# Patient Record
Sex: FEMALE | Race: WHITE | Employment: FULL TIME | URBAN - METROPOLITAN AREA
[De-identification: names, ages, dates, MRNs, and addresses within clinical notes are randomized per-mention and may not be internally consistent; named-entity substitution may affect disease eponyms.]

---

## 2016-09-28 LAB
HBV SURFACE AG SERPL QL IA: REACTIVE
HIV 1+2 AB+HIV1 P24 AG SERPL QL IA: NON REACTIVE
RUBELLA ABY IGG: NORMAL
T PALLIDUM IGG SER QL: NON REACTIVE

## 2017-01-09 ENCOUNTER — ONCOLOGY VISIT (OUTPATIENT)
Dept: ONCOLOGY | Facility: CLINIC | Age: 32
End: 2017-01-09
Attending: OBSTETRICS & GYNECOLOGY
Payer: COMMERCIAL

## 2017-01-09 VITALS
SYSTOLIC BLOOD PRESSURE: 124 MMHG | RESPIRATION RATE: 16 BRPM | WEIGHT: 180.1 LBS | HEIGHT: 66 IN | OXYGEN SATURATION: 97 % | HEART RATE: 90 BPM | TEMPERATURE: 97.9 F | BODY MASS INDEX: 28.94 KG/M2 | DIASTOLIC BLOOD PRESSURE: 73 MMHG

## 2017-01-09 DIAGNOSIS — D27.1 MUCINOUS CYSTADENOMA OF LEFT OVARY: Primary | ICD-10-CM

## 2017-01-09 PROCEDURE — 99212 OFFICE O/P EST SF 10 MIN: CPT | Mod: ZF

## 2017-01-09 ASSESSMENT — PAIN SCALES - GENERAL: PAINLEVEL: NO PAIN (0)

## 2017-01-09 NOTE — Clinical Note
2017       RE: Haylie Bales  [id number]  [facility] [address]     Dear Colleague,    Thank you for referring your patient, Haylie Bales, to the [facility]. Please see a copy of my visit note below.    Gynecologic Oncology Clinic - Follow-up Visit    Date: 2017    Dr. Jose Mendosa MD  [address]  [place] [address]     RE: Haylie Bales  : 1985  VARUN: 2017    HPI:    Haylie Bales is a 31 year old woman  at 23w2d s/p diagnostic laparoscopy with left salpingo-oophorectomy on 12/15/16. 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 Haylie states she is feeling well. She only used pain medication for the first 36 hours following surgery. She denies any nausea, vomiting, fever, chills, chest pain, shortness of breath, or dysuria. Reviewed further surgical restrictions including 6 weeks of pelvic rest and no heavy lifting. She follows with OBMARSHALL Bajwa with Dr. Macias. Patient feels good fetal movement      Review of Systems:  See HPI    Past Medical History:  Past Medical History   Diagnosis Date     No active medical problems      Pregnancy      Achilles tendonitis        Past Surgical History:  Past Surgical History   Procedure Laterality Date     Hc tooth extraction w/forcep       Lasik bilateral       Laparoscopic salpingo-oophorectomy Left 12/15/2016     Procedure: LAPAROSCOPIC SALPINGO-OOPHORECTOMY;  Surgeon: Jose Mendosa MD;  Location:  OR       Health Maintenance:  Health Maintenance Due   Topic Date Due     INFLUENZA VACCINE (SYSTEM ASSIGNED)  2016     MATERNAL SCREENING  2016     OBGCT (OB)  2017     PAP SCREENING Q3 YR (SYSTEM ASSIGNED)  2017       Current Medications:   Current Outpatient Prescriptions   Medication Sig Dispense Refill     Fjuwdv-Bdlofzivi-Jqoo-Fish Oil (PRENATAL + COMPLETE  "MULTI PO) Take 1 tablet by mouth daily       UNABLE TO FIND Take 1 capsule by mouth daily MEDICATION NAME: [place] [employer] DHEA 830 mg - Omega 3 400 IU         Allergies:   Allergies   Allergen Reactions     Sulfa Drugs Rash        Social History:     Social History   Substance Use Topics     Smoking status: Never Smoker      Smokeless tobacco: Never Used     Alcohol Use: No      Comment: For Marital Support stopped 2014       History   Drug Use No       Family History:   Family History   Problem Relation Age of Onset     Alcohol/Drug Mother      Etoh     Hypertension Mother      DIABETES Mother      Psychotic Disorder Father      Schizophrenia, Depression     Hypertension Father      CEREBROVASCULAR DISEASE Maternal Grandmother      CEREBROVASCULAR DISEASE Maternal Grandfather      Cardiovascular Paternal Grandmother      aortic dissection in her 80s       Physical Exam:   /73 mmHg  Pulse 90  Temp(Src) 97.9  F (36.6  C) (Oral)  Resp 16  Ht 1.676 m (5' 5.98\")  Wt 81.693 kg (180 lb 1.6 oz)  BMI 29.08 kg/m2  SpO2 97%  LMP 2016  Body mass index is 29.08 kg/(m^2).  General :  healthy and alert, no distress  Gastrointestinal:       abdomen soft, non-tender, non-distended, gravid, 4 laparoscopic sites visualized with good areas of healing, crusted skin present on incision site above umbilicus and on the right upper abdomen      Assessment:  Haylie Bales is a 31 year old woman  at 23w2d s/p diagnostic laparoscopy with left salpingo-oophorectomy on 12/15/16. 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 Patient is healing well from her surgery. Reviewed lifting precautions and pelvic rest for a total of 6 weeks from the surgery. Patient had no further questions or concerns. She will follow up with Dr. Macias as scheduled for routine " OBGYN care.    Acting scribe for Dr. Navya Jiménez MD  [facility], PGY-3  1/9/2017 7:05 AM     Jose Mendosa MD, MS    Department of Obstetrics and Gynecology   Division of Gynecologic Oncology   [facility]  Phone: [phone]    CC  Dr. Macias at [facility]

## 2017-01-09 NOTE — NURSING NOTE
"Haylie Bales is a 31 year old female who presents for:  Chief Complaint   Patient presents with     Oncology Clinic Visit     Post Op        Initial Vitals:  /73 mmHg  Pulse 90  Temp(Src) 97.9  F (36.6  C) (Oral)  Resp 16  Ht 1.676 m (5' 5.98\")  Wt 81.693 kg (180 lb 1.6 oz)  BMI 29.08 kg/m2  SpO2 97%  LMP 07/30/2016 Estimated body mass index is 29.08 kg/(m^2) as calculated from the following:    Height as of this encounter: 1.676 m (5' 5.98\").    Weight as of this encounter: 81.693 kg (180 lb 1.6 oz).. Body surface area is 1.95 meters squared. BP completed using cuff size: regular  No Pain (0) Patient's last menstrual period was 07/30/2016. Allergies and medications reviewed.     Medications: Medication refills not needed today.  Pharmacy name entered into [employer]: [id number] - [place], [place] - [address]    Comments:     7 minutes for nursing intake (face to face time)   Skylar Saeed LPN          "

## 2017-04-07 ENCOUNTER — APPOINTMENT (OUTPATIENT)
Dept: ULTRASOUND IMAGING | Facility: CLINIC | Age: 32
End: 2017-04-07
Attending: OBSTETRICS & GYNECOLOGY
Payer: COMMERCIAL

## 2017-04-07 ENCOUNTER — HOSPITAL ENCOUNTER (OUTPATIENT)
Facility: CLINIC | Age: 32
Discharge: HOME | End: 2017-04-07
Attending: OBSTETRICS & GYNECOLOGY | Admitting: OBSTETRICS & GYNECOLOGY
Payer: COMMERCIAL

## 2017-04-07 VITALS
HEIGHT: 66 IN | SYSTOLIC BLOOD PRESSURE: 139 MMHG | TEMPERATURE: 98.7 F | DIASTOLIC BLOOD PRESSURE: 83 MMHG | WEIGHT: 205 LBS | BODY MASS INDEX: 32.95 KG/M2

## 2017-04-07 LAB
ALT SERPL W P-5'-P-CCNC: 39 U/L (ref 0–50)
ANION GAP SERPL CALCULATED.3IONS-SCNC: 10 MMOL/L (ref 3–14)
AST SERPL W P-5'-P-CCNC: 21 U/L (ref 0–45)
BUN SERPL-MCNC: 12 MG/DL (ref 7–30)
CALCIUM SERPL-MCNC: 8.8 MG/DL (ref 8.5–10.1)
CHLORIDE SERPL-SCNC: 102 MMOL/L (ref 94–109)
CO2 SERPL-SCNC: 23 MMOL/L (ref 20–32)
CREAT SERPL-MCNC: 0.4 MG/DL (ref 0.52–1.04)
CREAT UR-MCNC: 21 MG/DL
ERYTHROCYTE [DISTWIDTH] IN BLOOD BY AUTOMATED COUNT: 13.5 % (ref 10–15)
GFR SERPL CREATININE-BSD FRML MDRD: ABNORMAL ML/MIN/1.7M2
GLUCOSE SERPL-MCNC: 85 MG/DL (ref 70–99)
HCT VFR BLD AUTO: 35.6 % (ref 35–47)
HGB BLD-MCNC: 11.8 G/DL (ref 11.7–15.7)
MCH RBC QN AUTO: 30.3 PG (ref 26.5–33)
MCHC RBC AUTO-ENTMCNC: 33.1 G/DL (ref 31.5–36.5)
MCV RBC AUTO: 92 FL (ref 78–100)
PLATELET # BLD AUTO: 206 10E9/L (ref 150–450)
POTASSIUM SERPL-SCNC: 4.3 MMOL/L (ref 3.4–5.3)
PROT UR-MCNC: 0.08 G/L
PROT/CREAT 24H UR: 0.37 G/G CR (ref 0–0.2)
RBC # BLD AUTO: 3.89 10E12/L (ref 3.8–5.2)
SODIUM SERPL-SCNC: 135 MMOL/L (ref 133–144)
URATE SERPL-MCNC: 3.6 MG/DL (ref 2.6–6)
WBC # BLD AUTO: 10 10E9/L (ref 4–11)

## 2017-04-07 PROCEDURE — 80048 BASIC METABOLIC PNL TOTAL CA: CPT | Performed by: OBSTETRICS & GYNECOLOGY

## 2017-04-07 PROCEDURE — 84450 TRANSFERASE (AST) (SGOT): CPT | Performed by: OBSTETRICS & GYNECOLOGY

## 2017-04-07 PROCEDURE — 59025 FETAL NON-STRESS TEST: CPT

## 2017-04-07 PROCEDURE — 96372 THER/PROPH/DIAG INJ SC/IM: CPT

## 2017-04-07 PROCEDURE — 85027 COMPLETE CBC AUTOMATED: CPT | Performed by: OBSTETRICS & GYNECOLOGY

## 2017-04-07 PROCEDURE — 76815 OB US LIMITED FETUS(S): CPT

## 2017-04-07 PROCEDURE — 25000125 ZZHC RX 250

## 2017-04-07 PROCEDURE — 36415 COLL VENOUS BLD VENIPUNCTURE: CPT | Performed by: OBSTETRICS & GYNECOLOGY

## 2017-04-07 PROCEDURE — 84550 ASSAY OF BLOOD/URIC ACID: CPT | Performed by: OBSTETRICS & GYNECOLOGY

## 2017-04-07 PROCEDURE — 99214 OFFICE O/P EST MOD 30 MIN: CPT | Mod: 25

## 2017-04-07 PROCEDURE — 84156 ASSAY OF PROTEIN URINE: CPT | Performed by: OBSTETRICS & GYNECOLOGY

## 2017-04-07 PROCEDURE — 82565 ASSAY OF CREATININE: CPT | Performed by: OBSTETRICS & GYNECOLOGY

## 2017-04-07 PROCEDURE — 76819 FETAL BIOPHYS PROFIL W/O NST: CPT

## 2017-04-07 PROCEDURE — 84460 ALANINE AMINO (ALT) (SGPT): CPT | Performed by: OBSTETRICS & GYNECOLOGY

## 2017-04-07 RX ORDER — BETAMETHASONE SODIUM PHOSPHATE AND BETAMETHASONE ACETATE 3; 3 MG/ML; MG/ML
12 INJECTION, SUSPENSION INTRA-ARTICULAR; INTRALESIONAL; INTRAMUSCULAR; SOFT TISSUE EVERY 24 HOURS
Status: DISCONTINUED | OUTPATIENT
Start: 2017-04-07 | End: 2017-04-07 | Stop reason: HOSPADM

## 2017-04-07 RX ORDER — ONDANSETRON 2 MG/ML
4 INJECTION INTRAMUSCULAR; INTRAVENOUS EVERY 6 HOURS PRN
Status: DISCONTINUED | OUTPATIENT
Start: 2017-04-07 | End: 2017-04-07 | Stop reason: HOSPADM

## 2017-04-07 RX ORDER — BETAMETHASONE SODIUM PHOSPHATE AND BETAMETHASONE ACETATE 3; 3 MG/ML; MG/ML
INJECTION, SUSPENSION INTRA-ARTICULAR; INTRALESIONAL; INTRAMUSCULAR; SOFT TISSUE
Status: COMPLETED
Start: 2017-04-07 | End: 2017-04-07

## 2017-04-07 RX ADMIN — BETAMETHASONE SODIUM PHOSPHATE AND BETAMETHASONE ACETATE 12 MG: 3; 3 INJECTION, SUSPENSION INTRA-ARTICULAR; INTRALESIONAL; INTRAMUSCULAR at 19:21

## 2017-04-07 RX ADMIN — BETAMETHASONE SODIUM PHOSPHATE AND BETAMETHASONE ACETATE 12 MG: 3; 3 INJECTION, SUSPENSION INTRA-ARTICULAR; INTRALESIONAL; INTRAMUSCULAR; SOFT TISSUE at 19:21

## 2017-04-07 NOTE — PLAN OF CARE
Primip comes to triage from clinic for elevated blood pressures.    EUM/US applied. Serial BPs initiated.  Urine collected and labs drawn.    Admission database obtained, and prenatal record reviewed.    Monitors removed, and Pt to Ultrasound for BPP and growth US.    P returned from US.    Results/BPs updated to Dr. Macias.    Give Beta.  Have Pt return to MAC in 24hours for repeat Beta, NST, and BP check.    Pt to call  Monday for appt to see Dr. Macias and BPP.    Discharge instructions given with verbal understanding, and Pt d/c to home with spouse.

## 2017-04-07 NOTE — IP AVS SNAPSHOT
MRN:[id number]                      After Visit Summary   4/7/2017    Haylie Bales    MRN: [id number]           Thank you!     Thank you for choosing [facility] for your care. Our goal is always to provide you with excellent care. Hearing back from our patients is one way we can continue to improve our services. Please take a few minutes to complete the written survey that you may receive in the mail after you visit with us. Thank you!        Patient Information     Date Of Birth          1985        About your hospital stay     You were admitted on:  April 7, 2017 You last received care in the:  [facility]    You were discharged on:  April 7, 2017       Who to Call     For medical emergencies, please call 911.  For non-urgent questions about your medical care, please call your primary care provider or clinic, [phone]          Attending Provider     Provider Specialty    Bettina Orozco MD OB/Gyn       Primary Care Provider Office Phone # Fax #    Bettina Macias [phone]       OB-GYN [address] [address]        Further instructions from your care team       Discharge Instruction for Undelivered Patients      You were seen for: Blood Pressure Assessment  We Consulted: Dr. Macias  You had (Test or Medicine):Fetal monitoring; US/BPP; PIH labs; Betamethasone     Diet:   Drink 8 to 12 glasses of liquids (milk, juice, water) every day.  You may eat meals and snacks.     Activity:  Call your doctor or nurse midwife if your baby is moving less than usual.     Call your provider if you notice:  Swelling in your face or increased swelling in your hands or legs.  Headaches that are not relieved by Tylenol (acetaminophen).  Changes in your vision (blurring: seeing spots or stars.)  Nausea (sick to your stomach) and vomiting (throwing up).   Weight gain of 5 pounds or more per week.  Heartburn that  "doesn't go away.  Signs of bladder infection: pain when you urinate (use the toilet), need to go more often and more urgently.  The bag of rubio (rupture of membranes) breaks, or you notice leaking in your underwear.  Bright red blood in your underwear.  Abdominal (lower belly) or stomach pain.  For first baby: Contractions (tightening) less than 5 minutes apart for one hour or more.  Second (plus) baby: Contractions (tightening) less than 10 minutes apart and getting stronger.  *If less than 34 weeks: Contractions (tightenings) more than 6 times in one hour.  Increase or change in vaginal discharge (note the color and amount)  Other:     Follow-up:    Return to Triage tomorrow at 1930 for repeat Betamethasone, NST, and BP check.    Call Mon am to schedule appt and BPP with Dr. Macias on Tues.          Pending Results     No orders found from 4/5/2017 to 4/8/2017.            Admission Information     Date & Time Provider Department Dept. Phone    4/7/2017 Bettina Orozco MD [facility] [phone]      Your Vitals Were     Blood Pressure Temperature Height Weight Last Period BMI (Body Mass Index)    139/83 98.7  F (37.1  C) (Axillary) 1.676 m (5' 6\") 93 kg (205 lb) 07/30/2016 33.09 kg/m2      [employer] Information     [employer] gives you secure access to your electronic health record. If you see a primary care provider, you can also send messages to your care team and make appointments. If you have questions, please call your primary care clinic.  If you do not have a primary care provider, please call [phone] and they will assist you.        Care EveryWhere ID     This is your Care EveryWhere ID. This could be used by other organizations to access your [place] medical records  [id number]           Review of your medicines      UNREVIEWED medicines. Ask your doctor about these medicines        Dose / Directions    ferrous sulfate Dried 160 (50 FE) MG tablet   Indication:  Iron " Deficiency        Dose:  1 tablet   Take 1 tablet by mouth 2 times daily   Refills:  0       PRENATAL + COMPLETE MULTI PO        Dose:  1 tablet   Take 1 tablet by mouth daily   Refills:  0                Protect others around you: Learn how to safely use, store and throw away your medicines at www.disposemymeds.org.             Medication List: This is a list of all your medications and when to take them. Check marks below indicate your daily home schedule. Keep this list as a reference.      Medications           Morning Afternoon Evening Bedtime As Needed    ferrous sulfate Dried 160 (50 FE) MG tablet   Take 1 tablet by mouth 2 times daily                                PRENATAL + COMPLETE MULTI PO   Take 1 tablet by mouth daily

## 2017-04-07 NOTE — IP AVS SNAPSHOT
[facility]    [address]    [place] [address]    Phone:  [phone]                                       After Visit Summary   4/7/2017    Haylie Bales    MRN: [id number]           After Visit Summary Signature Page     I have received my discharge instructions, and my questions have been answered. I have discussed any challenges I see with this plan with the nurse or doctor.    ..........................................................................................................................................  Patient/Patient Representative Signature      ..........................................................................................................................................  Patient Representative Print Name and Relationship to Patient    ..................................................               ................................................  Date                                            Time    ..........................................................................................................................................  Reviewed by Signature/Title    ...................................................              ..............................................  Date                                                            Time

## 2017-04-08 ENCOUNTER — HOSPITAL ENCOUNTER (OUTPATIENT)
Facility: CLINIC | Age: 32
Discharge: HOME | End: 2017-04-08
Attending: OBSTETRICS & GYNECOLOGY | Admitting: OBSTETRICS & GYNECOLOGY
Payer: COMMERCIAL

## 2017-04-08 VITALS — DIASTOLIC BLOOD PRESSURE: 74 MMHG | TEMPERATURE: 98.2 F | SYSTOLIC BLOOD PRESSURE: 127 MMHG

## 2017-04-08 PROCEDURE — 99213 OFFICE O/P EST LOW 20 MIN: CPT | Mod: 25

## 2017-04-08 PROCEDURE — 25000125 ZZHC RX 250: Performed by: OBSTETRICS & GYNECOLOGY

## 2017-04-08 PROCEDURE — 96372 THER/PROPH/DIAG INJ SC/IM: CPT

## 2017-04-08 PROCEDURE — 59025 FETAL NON-STRESS TEST: CPT

## 2017-04-08 PROCEDURE — 99214 OFFICE O/P EST MOD 30 MIN: CPT | Mod: 25

## 2017-04-08 RX ORDER — ONDANSETRON 2 MG/ML
4 INJECTION INTRAMUSCULAR; INTRAVENOUS EVERY 6 HOURS PRN
Status: DISCONTINUED | OUTPATIENT
Start: 2017-04-08 | End: 2017-04-08 | Stop reason: HOSPADM

## 2017-04-08 RX ORDER — BETAMETHASONE SODIUM PHOSPHATE AND BETAMETHASONE ACETATE 3; 3 MG/ML; MG/ML
12 INJECTION, SUSPENSION INTRA-ARTICULAR; INTRALESIONAL; INTRAMUSCULAR; SOFT TISSUE ONCE
Status: COMPLETED | OUTPATIENT
Start: 2017-04-08 | End: 2017-04-08

## 2017-04-08 RX ADMIN — BETAMETHASONE SODIUM PHOSPHATE AND BETAMETHASONE ACETATE 12 MG: 3; 3 INJECTION, SUSPENSION INTRA-ARTICULAR; INTRALESIONAL; INTRAMUSCULAR at 20:22

## 2017-04-08 NOTE — IP AVS SNAPSHOT
MRN:[id number]                      After Visit Summary   4/8/2017    Haylie Bales    MRN: [id number]           Thank you!     Thank you for choosing [facility] for your care. Our goal is always to provide you with excellent care. Hearing back from our patients is one way we can continue to improve our services. Please take a few minutes to complete the written survey that you may receive in the mail after you visit with us. Thank you!        Patient Information     Date Of Birth          1985        About your hospital stay     You were admitted on:  April 8, 2017 You last received care in the:  [facility]    You were discharged on:  April 8, 2017       Who to Call     For medical emergencies, please call 911.  For non-urgent questions about your medical care, please call your primary care provider or clinic, [phone]          Attending Provider     Provider Specialty    Bettina Orozco MD OB/Gyn       Primary Care Provider Office Phone # Fax #    Bettina Macias [phone]       OB-GYN [facility] [id number] [place] [address]        Further instructions from your care team       Discharge Instruction for Undelivered Patients      You were seen for: Fetal Assessment, Blood Pressure assessment and 2nd dose of Betamethasone  We Consulted: Dr. Evan Jules  You had (Test or Medicine):Fetal Non Stress test, BMZ injection, serial BP's     Diet:   Drink 8 to 12 glasses of liquids (milk, juice, water) every day.  You may eat meals and snacks.     Activity:  Count fetal kicks everyday (see handout)  Call your doctor or nurse midwife if your baby is moving less than usual.     Call your provider if you notice:  Swelling in your face or increased swelling in your hands or legs.  Headaches that are not relieved by Tylenol (acetaminophen).  Changes in your vision (blurring: seeing spots or stars.)  Nausea (sick to your  stomach) and vomiting (throwing up).   Weight gain of 5 pounds or more per week.  Heartburn that doesn't go away.  Signs of bladder infection: pain when you urinate (use the toilet), need to go more often and more urgently.  The bag of rubio (rupture of membranes) breaks, or you notice leaking in your underwear.  Bright red blood in your underwear.  Abdominal (lower belly) or stomach pain.  For first baby: Contractions (tightening) less than 5 minutes apart for one hour or more.  Follow-up:  As scheduled in the clinic          Pending Results     No orders found from 4/6/2017 to 4/9/2017.            Admission Information     Date & Time Provider Department Dept. Phone    4/8/2017 Bettina Orozco MD [facility] [phone]      Your Vitals Were     Last Period                   07/30/2016           MyChart Information     [employer] gives you secure access to your electronic health record. If you see a primary care provider, you can also send messages to your care team and make appointments. If you have questions, please call your primary care clinic.  If you do not have a primary care provider, please call [phone] and they will assist you.        Care EveryWhere ID     This is your Care EveryWhere ID. This could be used by other organizations to access your [place] medical records  [id number]           Review of your medicines      UNREVIEWED medicines. Ask your doctor about these medicines        Dose / Directions    ferrous sulfate Dried 160 (50 FE) MG tablet   Indication:  Iron Deficiency        Dose:  1 tablet   Take 1 tablet by mouth 2 times daily   Refills:  0       PRENATAL + COMPLETE MULTI PO        Dose:  1 tablet   Take 1 tablet by mouth daily   Refills:  0                Protect others around you: Learn how to safely use, store and throw away your medicines at www.disposemymeds.org.             Medication List: This is a list of all your medications and when to take  them. Check marks below indicate your daily home schedule. Keep this list as a reference.      Medications           Morning Afternoon Evening Bedtime As Needed    ferrous sulfate Dried 160 (50 FE) MG tablet   Take 1 tablet by mouth 2 times daily                                PRENATAL + COMPLETE MULTI PO   Take 1 tablet by mouth daily

## 2017-04-08 NOTE — IP AVS SNAPSHOT
[facility]    [address]    [place] [address]    Phone:  [phone]                                       After Visit Summary   4/8/2017    Haylie Bales    MRN: [id number]           After Visit Summary Signature Page     I have received my discharge instructions, and my questions have been answered. I have discussed any challenges I see with this plan with the nurse or doctor.    ..........................................................................................................................................  Patient/Patient Representative Signature      ..........................................................................................................................................  Patient Representative Print Name and Relationship to Patient    ..................................................               ................................................  Date                                            Time    ..........................................................................................................................................  Reviewed by Signature/Title    ...................................................              ..............................................  Date                                                            Time

## 2017-04-08 NOTE — DISCHARGE INSTRUCTIONS
Discharge Instruction for Undelivered Patients      You were seen for: Blood Pressure Assessment  We Consulted: Dr. Macias  You had (Test or Medicine):Fetal monitoring; US/BPP; PIH labs; Betamethasone     Diet:   Drink 8 to 12 glasses of liquids (milk, juice, water) every day.  You may eat meals and snacks.     Activity:  Call your doctor or nurse midwife if your baby is moving less than usual.     Call your provider if you notice:  Swelling in your face or increased swelling in your hands or legs.  Headaches that are not relieved by Tylenol (acetaminophen).  Changes in your vision (blurring: seeing spots or stars.)  Nausea (sick to your stomach) and vomiting (throwing up).   Weight gain of 5 pounds or more per week.  Heartburn that doesn't go away.  Signs of bladder infection: pain when you urinate (use the toilet), need to go more often and more urgently.  The bag of rubio (rupture of membranes) breaks, or you notice leaking in your underwear.  Bright red blood in your underwear.  Abdominal (lower belly) or stomach pain.  For first baby: Contractions (tightening) less than 5 minutes apart for one hour or more.  Second (plus) baby: Contractions (tightening) less than 10 minutes apart and getting stronger.  *If less than 34 weeks: Contractions (tightenings) more than 6 times in one hour.  Increase or change in vaginal discharge (note the color and amount)  Other:     Follow-up:    Return to Triage tomorrow at 1930 for repeat Betamethasone, NST, and BP check.    Call Mon am to schedule appt and BPP with Dr. Macias on Tues.

## 2017-04-09 NOTE — PLAN OF CARE
Patient presents to [facility], ambulatory and accompanied by , Serge, for 2nd BMZ, NST and blood pressure check. Prenatal record reviewed. Physical assessment obtained. Admission data completed. Pt denies headache, visual changes, epigastric pain or increased edema, ctx's, VB or LOF. Pt and spouse oriented to room and call light. EFM/toco applied with verbal consent. fht's 130's.    NST obtained. BP's WNL. BMZ injection given.    Dr. Jules updated regarding, but not limited to, fht's, BP's,and uterine activity. d/c orders received. Discharge instructions reviewed with pt and spouse. Both verbalized understanding. Pt d/c'd to home

## 2017-04-09 NOTE — DISCHARGE INSTRUCTIONS
Discharge Instruction for Undelivered Patients      You were seen for: Fetal Assessment, Blood Pressure assessment and 2nd dose of Betamethasone  We Consulted: Dr. Evan Jules  You had (Test or Medicine):Fetal Non Stress test, BMZ injection, serial BP's     Diet:   Drink 8 to 12 glasses of liquids (milk, juice, water) every day.  You may eat meals and snacks.     Activity:  Count fetal kicks everyday (see handout)  Call your doctor or nurse midwife if your baby is moving less than usual.     Call your provider if you notice:  Swelling in your face or increased swelling in your hands or legs.  Headaches that are not relieved by Tylenol (acetaminophen).  Changes in your vision (blurring: seeing spots or stars.)  Nausea (sick to your stomach) and vomiting (throwing up).   Weight gain of 5 pounds or more per week.  Heartburn that doesn't go away.  Signs of bladder infection: pain when you urinate (use the toilet), need to go more often and more urgently.  The bag of rubio (rupture of membranes) breaks, or you notice leaking in your underwear.  Bright red blood in your underwear.  Abdominal (lower belly) or stomach pain.  For first baby: Contractions (tightening) less than 5 minutes apart for one hour or more.  Follow-up:  As scheduled in the clinic

## 2017-04-13 RX ORDER — OXYCODONE AND ACETAMINOPHEN 5; 325 MG/1; MG/1
1 TABLET ORAL
Status: CANCELLED | OUTPATIENT
Start: 2017-04-13

## 2017-04-13 RX ORDER — PENICILLIN G POTASSIUM 5000000 [IU]/1
5 INJECTION, POWDER, FOR SOLUTION INTRAMUSCULAR; INTRAVENOUS ONCE
Status: CANCELLED | OUTPATIENT
Start: 2017-04-13

## 2017-04-13 RX ORDER — OXYTOCIN 10 [USP'U]/ML
10 INJECTION, SOLUTION INTRAMUSCULAR; INTRAVENOUS
Status: CANCELLED | OUTPATIENT
Start: 2017-04-13

## 2017-04-13 RX ORDER — ONDANSETRON 2 MG/ML
4 INJECTION INTRAMUSCULAR; INTRAVENOUS EVERY 6 HOURS PRN
Status: CANCELLED | OUTPATIENT
Start: 2017-04-13

## 2017-04-13 RX ORDER — CARBOPROST TROMETHAMINE 250 UG/ML
250 INJECTION, SOLUTION INTRAMUSCULAR
Status: CANCELLED | OUTPATIENT
Start: 2017-04-13

## 2017-04-13 RX ORDER — LIDOCAINE 40 MG/G
CREAM TOPICAL
Status: CANCELLED | OUTPATIENT
Start: 2017-04-13

## 2017-04-13 RX ORDER — IBUPROFEN 800 MG/1
800 TABLET, FILM COATED ORAL
Status: CANCELLED | OUTPATIENT
Start: 2017-04-13

## 2017-04-13 RX ORDER — NALOXONE HYDROCHLORIDE 0.4 MG/ML
.1-.4 INJECTION, SOLUTION INTRAMUSCULAR; INTRAVENOUS; SUBCUTANEOUS
Status: CANCELLED | OUTPATIENT
Start: 2017-04-13

## 2017-04-13 RX ORDER — ACETAMINOPHEN 325 MG/1
650 TABLET ORAL EVERY 4 HOURS PRN
Status: CANCELLED | OUTPATIENT
Start: 2017-04-13

## 2017-04-13 RX ORDER — OXYTOCIN/0.9 % SODIUM CHLORIDE 30/500 ML
100-340 PLASTIC BAG, INJECTION (ML) INTRAVENOUS CONTINUOUS PRN
Status: CANCELLED | OUTPATIENT
Start: 2017-04-13

## 2017-04-13 RX ORDER — METHYLERGONOVINE MALEATE 0.2 MG/ML
200 INJECTION INTRAVENOUS
Status: CANCELLED | OUTPATIENT
Start: 2017-04-13

## 2017-04-13 RX ORDER — SODIUM CHLORIDE, SODIUM LACTATE, POTASSIUM CHLORIDE, CALCIUM CHLORIDE 600; 310; 30; 20 MG/100ML; MG/100ML; MG/100ML; MG/100ML
INJECTION, SOLUTION INTRAVENOUS CONTINUOUS
Status: CANCELLED | OUTPATIENT
Start: 2017-04-13

## 2017-04-17 ENCOUNTER — HOSPITAL ENCOUNTER (INPATIENT)
Facility: CLINIC | Age: 32
LOS: 2 days | Discharge: HOME | End: 2017-04-20
Attending: OBSTETRICS & GYNECOLOGY | Admitting: OBSTETRICS & GYNECOLOGY
Payer: COMMERCIAL

## 2017-04-17 PROCEDURE — 25000132 ZZH RX MED GY IP 250 OP 250 PS 637: Performed by: PHYSICIAN ASSISTANT

## 2017-04-17 PROCEDURE — S0191 MISOPROSTOL, ORAL, 200 MCG: HCPCS | Performed by: PHYSICIAN ASSISTANT

## 2017-04-17 RX ORDER — MISOPROSTOL 100 UG/1
25 TABLET ORAL EVERY 4 HOURS PRN
Status: DISCONTINUED | OUTPATIENT
Start: 2017-04-17 | End: 2017-04-19

## 2017-04-17 RX ORDER — ZOLPIDEM TARTRATE 5 MG/1
5 TABLET ORAL
Status: DISCONTINUED | OUTPATIENT
Start: 2017-04-17 | End: 2017-04-19

## 2017-04-17 RX ADMIN — MISOPROSTOL 25 MCG: 100 TABLET ORAL at 20:53

## 2017-04-17 RX ADMIN — ZOLPIDEM TARTRATE 5 MG: 5 TABLET, FILM COATED ORAL at 23:10

## 2017-04-17 NOTE — IP AVS SNAPSHOT
MRN:[id number]                      After Visit Summary   4/17/2017    Haylie Bales    MRN: [id number]           Thank you!     Thank you for choosing [facility] for your care. Our goal is always to provide you with excellent care. Hearing back from our patients is one way we can continue to improve our services. Please take a few minutes to complete the written survey that you may receive in the mail after you visit with us. Thank you!        Patient Information     Date Of Birth          1985        Designated Caregiver       Most Recent Value    Caregiver    Will someone help with your care after discharge? yes    Name of designated caregiver Serge      About your hospital stay     You were admitted on:  April 17, 2017 You last received care in the:  [address]    You were discharged on:  April 20, 2017       Who to Call     For medical emergencies, please call 911.  For non-urgent questions about your medical care, please call your primary care provider or clinic, [phone]          Attending Provider     Provider Specialty    Bettina Orozco MD OB/Gyn       Primary Care Provider Office Phone # Fax #    Bettina Macias [phone]       [facility] [place] [address] [address] [address]        Further instructions from your care team       Postpartum Vaginal Delivery Instructions    Activity       Ask family and friends for help when you need it.    Do not place anything in your vagina for 6 weeks.    You are not restricted on other activities, but take it easy for a few weeks to allow your body to recover from delivery.  You are able to do any activities you feel up to that point.    No driving until you have stopped taking your pain medications (usually two weeks after delivery).     Call your health care provider if you have any of these symptoms:       Increased pain, swelling, redness, or  fluid around your stiches from an episiotomy or perineal tear.    A fever above 100.4 F (38 C) with or without chills when placing a thermometer under your tongue.    You soak a sanitary pad with blood within 1 hour, or you see blood clots larger than a golf ball.    Bleeding that lasts more than 6 weeks.    Vaginal discharge that smells bad.    Severe pain, cramping or tenderness in your lower belly area.    A need to urinate more frequently (use the toilet more often), more urgently (use the toilet very quickly), or it burns when you urinate.    Nausea and vomiting.    Redness, swelling or pain around a vein in your leg.    Problems breastfeeding or a red or painful area on your breast.    Chest pain and cough or are gasping for air.    Problems coping with sadness, anxiety, or depression.  If you have any concerns about hurting yourself or the baby, call your provider immediately.     You have questions or concerns after you return home.     Keep your hands clean:  Always wash your hands before touching your perineal area and stitches.  This helps reduce your risk of infection.  If your hands aren't dirty, you may use an alcohol hand-rub to clean your hands. Keep your nails clean and short.       *Follow up with MD in 6 weeks.       Pending Results     No orders found from 4/15/2017 to 4/18/2017.            Statement of Approval     Ordered          04/20/17 0702  I have reviewed and agree with all the recommendations and orders detailed in this document.  EFFECTIVE NOW     Approved and electronically signed by:  Bettina Orozco MD             Admission Information     Date & Time Provider Department Dept. Phone    4/17/2017 Bettina Orozco MD [address] [phone]      Your Vitals Were     Blood Pressure Pulse Temperature Respirations Weight Last Period    127/79 (BP Location: Right arm) 100 97.6  F (36.4  C) (Oral) 16 93.4 kg (206 lb) 07/30/2016     Pulse Oximetry BMI (Body Mass Index)                97% 33.25 kg/m2          miiCard Information     miiCard gives you secure access to your electronic health record. If you see a primary care provider, you can also send messages to your care team and make appointments. If you have questions, please call your primary care clinic.  If you do not have a primary care provider, please call [phone] and they will assist you.        Care EveryWhere ID     This is your Care EveryWhere ID. This could be used by other organizations to access your [facility] medical records  [id number]           Review of your medicines      START taking        Dose / Directions    ibuprofen 400 MG tablet   Commonly known as:  ADVIL/MOTRIN        Dose:  400-800 mg   Take 1-2 tablets (400-800 mg) by mouth every 6 hours as needed for other (cramping)   Quantity:  30 tablet   Refills:  0         CONTINUE these medicines which have NOT CHANGED        Dose / Directions    ferrous sulfate Dried 160 (50 FE) MG tablet   Indication:  Iron Deficiency        Dose:  1 tablet   Take 1 tablet by mouth 2 times daily   Refills:  0       PRENATAL + COMPLETE MULTI PO        Dose:  1 tablet   Take 1 tablet by mouth daily   Refills:  0            Where to get your medicines      These medications were sent to [id number] - [place] - [address]  [address] [address]     Phone:  [phone]     ibuprofen 400 MG tablet                Protect others around you: Learn how to safely use, store and throw away your medicines at www.disposemymeds.org.             Medication List: This is a list of all your medications and when to take them. Check marks below indicate your daily home schedule. Keep this list as a reference.      Medications           Morning Afternoon Evening Bedtime As Needed    ferrous sulfate Dried 160 (50 FE) MG tablet   Take 1 tablet by mouth 2 times daily                                ibuprofen 400 MG  tablet   Commonly known as:  ADVIL/MOTRIN   Take 1-2 tablets (400-800 mg) by mouth every 6 hours as needed for other (cramping)   Last time this was given:  800 mg on 4/20/2017 12:04 PM                                PRENATAL + COMPLETE MULTI PO   Take 1 tablet by mouth daily

## 2017-04-17 NOTE — IP AVS SNAPSHOT
[address]    [place] [address]    Phone:  [phone]                                       After Visit Summary   4/17/2017    Haylie Bales    MRN: [id number]           After Visit Summary Signature Page     I have received my discharge instructions, and my questions have been answered. I have discussed any challenges I see with this plan with the nurse or doctor.    ..........................................................................................................................................  Patient/Patient Representative Signature      ..........................................................................................................................................  Patient Representative Print Name and Relationship to Patient    ..................................................               ................................................  Date                                            Time    ..........................................................................................................................................  Reviewed by Signature/Title    ...................................................              ..............................................  Date                                                            Time

## 2017-04-18 ENCOUNTER — ANESTHESIA EVENT (OUTPATIENT)
Dept: OBGYN | Facility: CLINIC | Age: 32
End: 2017-04-18
Payer: COMMERCIAL

## 2017-04-18 ENCOUNTER — ANESTHESIA (OUTPATIENT)
Dept: OBGYN | Facility: CLINIC | Age: 32
End: 2017-04-18
Payer: COMMERCIAL

## 2017-04-18 LAB
ABO + RH BLD: NORMAL
ABO + RH BLD: NORMAL
ALBUMIN SERPL-MCNC: 2.7 G/DL (ref 3.4–5)
ALP SERPL-CCNC: 125 U/L (ref 40–150)
ALT SERPL W P-5'-P-CCNC: 33 U/L (ref 0–50)
ANION GAP SERPL CALCULATED.3IONS-SCNC: 9 MMOL/L (ref 3–14)
AST SERPL W P-5'-P-CCNC: 15 U/L (ref 0–45)
BILIRUB SERPL-MCNC: 0.2 MG/DL (ref 0.2–1.3)
BLD GP AB SCN SERPL QL: NORMAL
BLOOD BANK CMNT PATIENT-IMP: NORMAL
BUN SERPL-MCNC: 8 MG/DL (ref 7–30)
CALCIUM SERPL-MCNC: 9 MG/DL (ref 8.5–10.1)
CHLORIDE SERPL-SCNC: 104 MMOL/L (ref 94–109)
CO2 SERPL-SCNC: 23 MMOL/L (ref 20–32)
CREAT SERPL-MCNC: 0.4 MG/DL (ref 0.52–1.04)
ERYTHROCYTE [DISTWIDTH] IN BLOOD BY AUTOMATED COUNT: 13.8 % (ref 10–15)
GFR SERPL CREATININE-BSD FRML MDRD: ABNORMAL ML/MIN/1.7M2
GLUCOSE SERPL-MCNC: 119 MG/DL (ref 70–99)
HCT VFR BLD AUTO: 38.8 % (ref 35–47)
HGB BLD-MCNC: 12.9 G/DL (ref 11.7–15.7)
MCH RBC QN AUTO: 30.4 PG (ref 26.5–33)
MCHC RBC AUTO-ENTMCNC: 33.2 G/DL (ref 31.5–36.5)
MCV RBC AUTO: 91 FL (ref 78–100)
PLATELET # BLD AUTO: 198 10E9/L (ref 150–450)
POTASSIUM SERPL-SCNC: 4.1 MMOL/L (ref 3.4–5.3)
PROT SERPL-MCNC: 7 G/DL (ref 6.8–8.8)
RBC # BLD AUTO: 4.25 10E12/L (ref 3.8–5.2)
SODIUM SERPL-SCNC: 136 MMOL/L (ref 133–144)
SPECIMEN EXP DATE BLD: NORMAL
T PALLIDUM IGG+IGM SER QL: NEGATIVE
WBC # BLD AUTO: 12.3 10E9/L (ref 4–11)

## 2017-04-18 PROCEDURE — 25000125 ZZHC RX 250: Performed by: ANESTHESIOLOGY

## 2017-04-18 PROCEDURE — 80053 COMPREHEN METABOLIC PANEL: CPT | Performed by: OBSTETRICS & GYNECOLOGY

## 2017-04-18 PROCEDURE — 86780 TREPONEMA PALLIDUM: CPT | Performed by: OBSTETRICS & GYNECOLOGY

## 2017-04-18 PROCEDURE — 86901 BLOOD TYPING SEROLOGIC RH(D): CPT | Performed by: OBSTETRICS & GYNECOLOGY

## 2017-04-18 PROCEDURE — 88307 TISSUE EXAM BY PATHOLOGIST: CPT | Mod: 26 | Performed by: OBSTETRICS & GYNECOLOGY

## 2017-04-18 PROCEDURE — 12000029 ZZH R&B OB INTERMEDIATE

## 2017-04-18 PROCEDURE — 72200001 ZZH LABOR CARE VAGINAL DELIVERY SINGLE

## 2017-04-18 PROCEDURE — 25000125 ZZHC RX 250: Performed by: PHYSICIAN ASSISTANT

## 2017-04-18 PROCEDURE — 86900 BLOOD TYPING SEROLOGIC ABO: CPT | Performed by: OBSTETRICS & GYNECOLOGY

## 2017-04-18 PROCEDURE — 25000132 ZZH RX MED GY IP 250 OP 250 PS 637: Performed by: PHYSICIAN ASSISTANT

## 2017-04-18 PROCEDURE — 00HU33Z INSERTION OF INFUSION DEVICE INTO SPINAL CANAL, PERCUTANEOUS APPROACH: ICD-10-PCS | Performed by: ANESTHESIOLOGY

## 2017-04-18 PROCEDURE — 0KQM0ZZ REPAIR PERINEUM MUSCLE, OPEN APPROACH: ICD-10-PCS | Performed by: OBSTETRICS & GYNECOLOGY

## 2017-04-18 PROCEDURE — 0UQGXZZ REPAIR VAGINA, EXTERNAL APPROACH: ICD-10-PCS | Performed by: OBSTETRICS & GYNECOLOGY

## 2017-04-18 PROCEDURE — 88307 TISSUE EXAM BY PATHOLOGIST: CPT | Performed by: OBSTETRICS & GYNECOLOGY

## 2017-04-18 PROCEDURE — 85027 COMPLETE CBC AUTOMATED: CPT | Performed by: OBSTETRICS & GYNECOLOGY

## 2017-04-18 PROCEDURE — 25000132 ZZH RX MED GY IP 250 OP 250 PS 637: Performed by: OBSTETRICS & GYNECOLOGY

## 2017-04-18 PROCEDURE — 10907ZC DRAINAGE OF AMNIOTIC FLUID, THERAPEUTIC FROM PRODUCTS OF CONCEPTION, VIA NATURAL OR ARTIFICIAL OPENING: ICD-10-PCS | Performed by: OBSTETRICS & GYNECOLOGY

## 2017-04-18 PROCEDURE — 25000128 H RX IP 250 OP 636: Performed by: OBSTETRICS & GYNECOLOGY

## 2017-04-18 PROCEDURE — 25800025 ZZH RX 258: Performed by: OBSTETRICS & GYNECOLOGY

## 2017-04-18 PROCEDURE — 86850 RBC ANTIBODY SCREEN: CPT | Performed by: OBSTETRICS & GYNECOLOGY

## 2017-04-18 PROCEDURE — 36415 COLL VENOUS BLD VENIPUNCTURE: CPT | Performed by: OBSTETRICS & GYNECOLOGY

## 2017-04-18 PROCEDURE — 37000011 ZZH ANESTHESIA WARD SERVICE

## 2017-04-18 PROCEDURE — 25000125 ZZHC RX 250: Performed by: OBSTETRICS & GYNECOLOGY

## 2017-04-18 PROCEDURE — 25000128 H RX IP 250 OP 636: Performed by: ANESTHESIOLOGY

## 2017-04-18 PROCEDURE — S0191 MISOPROSTOL, ORAL, 200 MCG: HCPCS | Performed by: PHYSICIAN ASSISTANT

## 2017-04-18 PROCEDURE — 3E0R3CZ INTRODUCTION OF REGIONAL ANESTHETIC INTO SPINAL CANAL, PERCUTANEOUS APPROACH: ICD-10-PCS | Performed by: ANESTHESIOLOGY

## 2017-04-18 RX ORDER — METHYLERGONOVINE MALEATE 0.2 MG/ML
200 INJECTION INTRAVENOUS
Status: DISCONTINUED | OUTPATIENT
Start: 2017-04-18 | End: 2017-04-19

## 2017-04-18 RX ORDER — ACETAMINOPHEN 325 MG/1
650 TABLET ORAL EVERY 4 HOURS PRN
Status: DISCONTINUED | OUTPATIENT
Start: 2017-04-18 | End: 2017-04-18

## 2017-04-18 RX ORDER — ONDANSETRON 2 MG/ML
4 INJECTION INTRAMUSCULAR; INTRAVENOUS EVERY 6 HOURS PRN
Status: DISCONTINUED | OUTPATIENT
Start: 2017-04-18 | End: 2017-04-19

## 2017-04-18 RX ORDER — CARBOPROST TROMETHAMINE 250 UG/ML
250 INJECTION, SOLUTION INTRAMUSCULAR
Status: DISCONTINUED | OUTPATIENT
Start: 2017-04-18 | End: 2017-04-19

## 2017-04-18 RX ORDER — LIDOCAINE HYDROCHLORIDE AND EPINEPHRINE 15; 5 MG/ML; UG/ML
INJECTION, SOLUTION EPIDURAL PRN
Status: DISCONTINUED | OUTPATIENT
Start: 2017-04-18 | End: 2017-04-19 | Stop reason: HOSPADM

## 2017-04-18 RX ORDER — PENICILLIN G POTASSIUM 5000000 [IU]/1
5 INJECTION, POWDER, FOR SOLUTION INTRAMUSCULAR; INTRAVENOUS ONCE
Status: COMPLETED | OUTPATIENT
Start: 2017-04-18 | End: 2017-04-18

## 2017-04-18 RX ORDER — OXYTOCIN/0.9 % SODIUM CHLORIDE 30/500 ML
1-24 PLASTIC BAG, INJECTION (ML) INTRAVENOUS CONTINUOUS
Status: DISCONTINUED | OUTPATIENT
Start: 2017-04-18 | End: 2017-04-18

## 2017-04-18 RX ORDER — BISACODYL 10 MG
10 SUPPOSITORY, RECTAL RECTAL DAILY PRN
Status: DISCONTINUED | OUTPATIENT
Start: 2017-04-20 | End: 2017-04-20 | Stop reason: HOSPADM

## 2017-04-18 RX ORDER — FENTANYL CITRATE 50 UG/ML
100 INJECTION, SOLUTION INTRAMUSCULAR; INTRAVENOUS ONCE
Status: COMPLETED | OUTPATIENT
Start: 2017-04-18 | End: 2017-04-18

## 2017-04-18 RX ORDER — OXYTOCIN/0.9 % SODIUM CHLORIDE 30/500 ML
100-340 PLASTIC BAG, INJECTION (ML) INTRAVENOUS CONTINUOUS PRN
Status: COMPLETED | OUTPATIENT
Start: 2017-04-18 | End: 2017-04-18

## 2017-04-18 RX ORDER — HYDROCORTISONE 2.5 %
CREAM (GRAM) TOPICAL 3 TIMES DAILY PRN
Status: DISCONTINUED | OUTPATIENT
Start: 2017-04-18 | End: 2017-04-20 | Stop reason: HOSPADM

## 2017-04-18 RX ORDER — NALBUPHINE HYDROCHLORIDE 10 MG/ML
2.5-5 INJECTION, SOLUTION INTRAMUSCULAR; INTRAVENOUS; SUBCUTANEOUS EVERY 6 HOURS PRN
Status: DISCONTINUED | OUTPATIENT
Start: 2017-04-18 | End: 2017-04-19

## 2017-04-18 RX ORDER — IBUPROFEN 800 MG/1
800 TABLET, FILM COATED ORAL
Status: DISCONTINUED | OUTPATIENT
Start: 2017-04-18 | End: 2017-04-18

## 2017-04-18 RX ORDER — SODIUM CHLORIDE, SODIUM LACTATE, POTASSIUM CHLORIDE, CALCIUM CHLORIDE 600; 310; 30; 20 MG/100ML; MG/100ML; MG/100ML; MG/100ML
INJECTION, SOLUTION INTRAVENOUS CONTINUOUS
Status: DISCONTINUED | OUTPATIENT
Start: 2017-04-18 | End: 2017-04-19

## 2017-04-18 RX ORDER — AMOXICILLIN 250 MG
1-2 CAPSULE ORAL 2 TIMES DAILY
Status: DISCONTINUED | OUTPATIENT
Start: 2017-04-18 | End: 2017-04-20 | Stop reason: HOSPADM

## 2017-04-18 RX ORDER — OXYTOCIN/0.9 % SODIUM CHLORIDE 30/500 ML
100 PLASTIC BAG, INJECTION (ML) INTRAVENOUS CONTINUOUS
Status: DISCONTINUED | OUTPATIENT
Start: 2017-04-18 | End: 2017-04-20 | Stop reason: HOSPADM

## 2017-04-18 RX ORDER — NALOXONE HYDROCHLORIDE 0.4 MG/ML
.1-.4 INJECTION, SOLUTION INTRAMUSCULAR; INTRAVENOUS; SUBCUTANEOUS
Status: DISCONTINUED | OUTPATIENT
Start: 2017-04-18 | End: 2017-04-18

## 2017-04-18 RX ORDER — OXYTOCIN 10 [USP'U]/ML
10 INJECTION, SOLUTION INTRAMUSCULAR; INTRAVENOUS
Status: DISCONTINUED | OUTPATIENT
Start: 2017-04-18 | End: 2017-04-20 | Stop reason: HOSPADM

## 2017-04-18 RX ORDER — NALOXONE HYDROCHLORIDE 0.4 MG/ML
.1-.4 INJECTION, SOLUTION INTRAMUSCULAR; INTRAVENOUS; SUBCUTANEOUS
Status: DISCONTINUED | OUTPATIENT
Start: 2017-04-18 | End: 2017-04-20 | Stop reason: HOSPADM

## 2017-04-18 RX ORDER — OXYTOCIN 10 [USP'U]/ML
10 INJECTION, SOLUTION INTRAMUSCULAR; INTRAVENOUS
Status: DISCONTINUED | OUTPATIENT
Start: 2017-04-18 | End: 2017-04-19

## 2017-04-18 RX ORDER — LANOLIN 100 %
OINTMENT (GRAM) TOPICAL
Status: DISCONTINUED | OUTPATIENT
Start: 2017-04-18 | End: 2017-04-20 | Stop reason: HOSPADM

## 2017-04-18 RX ORDER — MISOPROSTOL 200 UG/1
400 TABLET ORAL
Status: DISCONTINUED | OUTPATIENT
Start: 2017-04-18 | End: 2017-04-20 | Stop reason: HOSPADM

## 2017-04-18 RX ORDER — OXYTOCIN/0.9 % SODIUM CHLORIDE 30/500 ML
340 PLASTIC BAG, INJECTION (ML) INTRAVENOUS CONTINUOUS PRN
Status: DISCONTINUED | OUTPATIENT
Start: 2017-04-18 | End: 2017-04-20 | Stop reason: HOSPADM

## 2017-04-18 RX ORDER — ROPIVACAINE HYDROCHLORIDE 2 MG/ML
10 INJECTION, SOLUTION EPIDURAL; INFILTRATION; PERINEURAL ONCE
Status: COMPLETED | OUTPATIENT
Start: 2017-04-18 | End: 2017-04-18

## 2017-04-18 RX ORDER — EPHEDRINE SULFATE 50 MG/ML
5 INJECTION, SOLUTION INTRAMUSCULAR; INTRAVENOUS; SUBCUTANEOUS
Status: DISCONTINUED | OUTPATIENT
Start: 2017-04-18 | End: 2017-04-19

## 2017-04-18 RX ORDER — ACETAMINOPHEN 325 MG/1
650 TABLET ORAL EVERY 4 HOURS PRN
Status: DISCONTINUED | OUTPATIENT
Start: 2017-04-18 | End: 2017-04-20 | Stop reason: HOSPADM

## 2017-04-18 RX ORDER — IBUPROFEN 400 MG/1
400-800 TABLET, FILM COATED ORAL EVERY 6 HOURS PRN
Status: DISCONTINUED | OUTPATIENT
Start: 2017-04-18 | End: 2017-04-20 | Stop reason: HOSPADM

## 2017-04-18 RX ORDER — OXYCODONE AND ACETAMINOPHEN 5; 325 MG/1; MG/1
1 TABLET ORAL
Status: DISCONTINUED | OUTPATIENT
Start: 2017-04-18 | End: 2017-04-19

## 2017-04-18 RX ADMIN — SODIUM CHLORIDE 2.5 MILLION UNITS: 9 INJECTION, SOLUTION INTRAVENOUS at 11:56

## 2017-04-18 RX ADMIN — Medication 340 ML/HR: at 21:20

## 2017-04-18 RX ADMIN — ACETAMINOPHEN 650 MG: 325 TABLET, FILM COATED ORAL at 22:20

## 2017-04-18 RX ADMIN — IBUPROFEN 800 MG: 400 TABLET ORAL at 22:21

## 2017-04-18 RX ADMIN — PENICILLIN G POTASSIUM 5 MILLION UNITS: 5000000 POWDER, FOR SOLUTION INTRAMUSCULAR; INTRAPLEURAL; INTRATHECAL; INTRAVENOUS at 08:06

## 2017-04-18 RX ADMIN — Medication 12 ML/HR: at 13:32

## 2017-04-18 RX ADMIN — FENTANYL CITRATE 100 MCG: 50 INJECTION, SOLUTION INTRAMUSCULAR; INTRAVENOUS at 13:30

## 2017-04-18 RX ADMIN — SODIUM CHLORIDE 2.5 MILLION UNITS: 9 INJECTION, SOLUTION INTRAVENOUS at 15:55

## 2017-04-18 RX ADMIN — SODIUM CHLORIDE, POTASSIUM CHLORIDE, SODIUM LACTATE AND CALCIUM CHLORIDE: 600; 310; 30; 20 INJECTION, SOLUTION INTRAVENOUS at 10:01

## 2017-04-18 RX ADMIN — Medication 2 MILLI-UNITS/MIN: at 10:01

## 2017-04-18 RX ADMIN — LIDOCAINE HYDROCHLORIDE AND EPINEPHRINE 2 ML: 15; 5 INJECTION, SOLUTION EPIDURAL at 13:28

## 2017-04-18 RX ADMIN — SODIUM CHLORIDE 2.5 MILLION UNITS: 9 INJECTION, SOLUTION INTRAVENOUS at 19:32

## 2017-04-18 RX ADMIN — MISOPROSTOL 25 MCG: 100 TABLET ORAL at 05:57

## 2017-04-18 RX ADMIN — MISOPROSTOL 25 MCG: 100 TABLET ORAL at 01:00

## 2017-04-18 RX ADMIN — SODIUM CHLORIDE, POTASSIUM CHLORIDE, SODIUM LACTATE AND CALCIUM CHLORIDE: 600; 310; 30; 20 INJECTION, SOLUTION INTRAVENOUS at 13:10

## 2017-04-18 RX ADMIN — Medication 5 MG: at 15:54

## 2017-04-18 RX ADMIN — SODIUM CHLORIDE, POTASSIUM CHLORIDE, SODIUM LACTATE AND CALCIUM CHLORIDE: 600; 310; 30; 20 INJECTION, SOLUTION INTRAVENOUS at 19:32

## 2017-04-18 RX ADMIN — Medication 5 MG: at 15:10

## 2017-04-18 RX ADMIN — LIDOCAINE HYDROCHLORIDE AND EPINEPHRINE 3 ML: 15; 5 INJECTION, SOLUTION EPIDURAL at 13:26

## 2017-04-18 RX ADMIN — Medication 5 MG: at 15:14

## 2017-04-18 RX ADMIN — ROPIVACAINE HYDROCHLORIDE 10 ML: 2 INJECTION, SOLUTION EPIDURAL; INFILTRATION at 13:30

## 2017-04-18 NOTE — ANESTHESIA PREPROCEDURE EVALUATION
Anesthesia Plan      History & Physical Review  History and physical reviewed and following examination; no interval change.    ASA Status:  3 .        Plan for Epidural     Primip healthy outside of pregnancy, now being induced for gestational HTN.  Labs reportedly normal.  Sulfa allergy.        Postoperative Care      Consents                          .

## 2017-04-18 NOTE — PLAN OF CARE
Problem: Labor (Cervical Ripen, Induct, Augment) (Adult,Obstetrics,Pediatric)  Goal: Signs and Symptoms of Listed Potential Problems Will be Absent or Manageable (Labor)  Signs and symptoms of listed potential problems will be absent or manageable by discharge/transition of care (reference Labor (Cervical Ripen, Induct, Augment) (Adult,Obstetrics,Pediatric) CPG).   Outcome: No Change    0715-Taking over care of pt. Report received from MORRIS Rahman. Haylie is a 30yo  37w3d who is here for induction of labor for gestational HTN. Denies HA, visual disturbances or epigastric pain. +FM. Denies ROM or bleeding. Denies feeling cxns. GBS+ Is open to pain medication options when she gets uncomfortable.   0745-SVE 1/50%/-2 AROM clear pink fluid. Fetal scalp electrode placed to do AROM. Pt tolerated well.  1001-SVE deferred, pitocin started.  1005-Dr Macias updated and ok with POC. Will plan to recheck SVE at 1130 and call her with update.   1310-Fentanyl and Ropivicaine handed off to Dr Hoskins  1435-O2 on, IV bolus running.  1800-Complete. Pt denies feeling pressure. Dr Macias notified. Plan to labor down until after report at 1900.

## 2017-04-18 NOTE — H&P
Haylie Bales is an 31 year old female. Pregnancy complicated by pre-eclampsia.  She also had an ovarian mass removed by gyn onc early second [facility]    Past Medical History:   Diagnosis Date     Achilles tendonitis      Anemia      Hypertension     elevated since      No active medical problems      Pregnancy        Allergies:   Allergies   Allergen Reactions     Sulfa Drugs Rash       Active Problems:    * No active hospital problems. *    Blood pressure 122/71, pulse 100, temperature 97.8  F (36.6  C), temperature source Temporal, resp. rate 16, weight 93.4 kg (206 lb), last menstrual period 2016, SpO2 95 %, not currently breastfeeding.    Review of Systems   All other systems reviewed and are negative.      Physical Exam   Constitutional: She appears well-developed and well-nourished.   HENT:   Head: Normocephalic and atraumatic.   Neck: Normal range of motion. Neck supple.   Cardiovascular: Normal rate and regular rhythm.    Pulmonary/Chest: Effort normal and breath sounds normal.       Assessment:   at 37+3 with pre-eclampsia    Plan:  Induction  S/p gilles Almaraz  2017

## 2017-04-18 NOTE — PLAN OF CARE
The EMR was down for 7.5 hours on 4/18/2017.    JENNIFER Ruby RN was responsible for completing the paper charting during this time period ([id number]).    The following information was re-entered into the system by Romy Ruby: MAR    The following information will remain in the paper chart: labor record    Romy Ruby  4/18/2017

## 2017-04-18 NOTE — ANESTHESIA PROCEDURE NOTES
Peripheral nerve/Neuraxial procedure note : epidural catheter  Pre-Procedure  Performed by JOE AHUJA  Location: OB      Pre-Anesthestic Checklist: patient identified, IV checked, risks and benefits discussed, informed consent, pre-op evaluation and at physician/surgeon's request    Timeout  Correct Patient: Yes   Correct Procedure: Yes       Correct Position: Yes     .   Procedure Documentation    .    Procedure:    Epidural catheter.  Insertion Site:L3-4  (midline approach) Injection technique: LORT air   Local skin infiltrated with 3 mL of 1% lidocaine.  YOSVANY at 5.5 cm     Patient Prep;mask, sterile gloves, povidone-iodine 7.5% surgical scrub, patient draped.  .  Needle: Touhy needle (17 G. 3.5 in). # of attempts: 1. # of redirects:.  Spinal Needle: . . . Catheter: 19 G . .  Catheter threaded easily  4 cm epidural space.  .   .    Assessment/Narrative  Paresthesias: No.  .  .  Aspiration negative for heme or CSF  . Test dose of mL lidocaine 1.5% w/ 1:200,000 epinephrine at. Test dose negative for signs of intravascular, subdural or intrathecal injection. Comments:  Test dose of 3cc negative, then additional 2cc of test dose given.    Adhesive spray, tegaderm, and tape to secure

## 2017-04-19 LAB — HGB BLD-MCNC: 11.2 G/DL (ref 11.7–15.7)

## 2017-04-19 PROCEDURE — 85018 HEMOGLOBIN: CPT | Performed by: OBSTETRICS & GYNECOLOGY

## 2017-04-19 PROCEDURE — 25000132 ZZH RX MED GY IP 250 OP 250 PS 637: Performed by: OBSTETRICS & GYNECOLOGY

## 2017-04-19 PROCEDURE — 12000037 ZZH R&B POSTPARTUM INTERMEDIATE

## 2017-04-19 PROCEDURE — 36415 COLL VENOUS BLD VENIPUNCTURE: CPT | Performed by: OBSTETRICS & GYNECOLOGY

## 2017-04-19 RX ADMIN — ACETAMINOPHEN 650 MG: 325 TABLET, FILM COATED ORAL at 04:14

## 2017-04-19 RX ADMIN — ACETAMINOPHEN 650 MG: 325 TABLET, FILM COATED ORAL at 22:51

## 2017-04-19 RX ADMIN — SENNOSIDES AND DOCUSATE SODIUM 1 TABLET: 8.6; 5 TABLET ORAL at 08:26

## 2017-04-19 RX ADMIN — ACETAMINOPHEN 650 MG: 325 TABLET, FILM COATED ORAL at 08:25

## 2017-04-19 RX ADMIN — ACETAMINOPHEN 650 MG: 325 TABLET, FILM COATED ORAL at 12:39

## 2017-04-19 RX ADMIN — IBUPROFEN 800 MG: 400 TABLET ORAL at 04:14

## 2017-04-19 RX ADMIN — IBUPROFEN 800 MG: 400 TABLET ORAL at 10:23

## 2017-04-19 RX ADMIN — SENNOSIDES AND DOCUSATE SODIUM 1 TABLET: 8.6; 5 TABLET ORAL at 19:33

## 2017-04-19 RX ADMIN — ACETAMINOPHEN 650 MG: 325 TABLET, FILM COATED ORAL at 16:37

## 2017-04-19 RX ADMIN — IBUPROFEN 800 MG: 400 TABLET ORAL at 22:51

## 2017-04-19 RX ADMIN — IBUPROFEN 800 MG: 400 TABLET ORAL at 16:37

## 2017-04-19 NOTE — L&D DELIVERY NOTE
Haylie Schofield [facility]  1985 4:23 PM  17    The patient is a 31 yr old  admitted to labor and delivery with induction of labor for mild pre-eclampsia.  She had artificial rupture of membranes.  The pregnancy was complicated by adnexal mass removed by GYN Onc.  She had mild pre-eclampsia.    She had good maternal expulsive efforts.     Fetal heart tones were in the cat 1 during 1st stage of labor.  During 2nd stage the fetal heart tones were cat 2.    The infant was delivered in the OA position with nuchal x 1.  Upon delivery the infant was handed off to the parents and the nursery team in attendance.  The male infant had apgars of 8 at 1 minute and 9 at 5 minutes.    The placenta delivered spontaneously and intact.      The cervix and vagina were inspected.  There was bilateral vaginal lacerations and second degree laceration.  The perineum was reapproximated with 3-0 vicryl with close approximation.  The estimated blood loss was 300 ml.

## 2017-04-19 NOTE — PROGRESS NOTES
[facility]       DAILY NOTE - POSTPARTUM DAY 1     SUBJECTIVE:     Pain controlled? Yes  Tolerating a regular diet? YES  Ambulating? YES  Voiding without difficulty? No: has catheter inserted. Had difficulty urinating. Will leave for now.     OBJECTIVE:  Vitals:    17 2300 17 0040 17 0825 17 1023   BP: 134/79 131/88 126/78    Pulse:       Resp:     Temp:  98.7  F (37.1  C) 97.8  F (36.6  C)    TempSrc:  Oral Oral    SpO2: 97% 97%     Weight:           Constitutional: healthy, alert and no distress    Abdomen:  Uterine fundus is firm, non-tender and at the level of the umbilicus     LABS:  Hemoglobin   Date Value Ref Range Status   2017 11.2 (L) 11.7 - 15.7 g/dL Final   2017 12.9 11.7 - 15.7 g/dL Final       ASSESSMENT:  Post-partum day #1 s/p   Pregnancy complicated by preeclampsia    Doing well.       PLAN:   Continue routine postpartum cares    Nga Deleon

## 2017-04-19 NOTE — PLAN OF CARE
0030-Pt unable to void after delivery.  Bladder scan for 470ml  0045-able to place white after 3 attempts.  Will keep in overnight d/t swelling, lacs on both sides of urethra,  and difficulty in placement.  Small hematoma noted above urethra and swollen below.  Area is tender to touch.  Will continue to monitor.

## 2017-04-19 NOTE — LACTATION NOTE
Initial Lactation visit. Hand out given. Recommend unlimited, frequent breast feedings: At least 8 - 12 times every 24 hours. Avoid pacifiers and supplementation with formula unless medically indicated. Explained benefits of holding baby skin on skin to help promote better breastfeeding outcomes. Will revisit as needed.    Annette Thomas RN, IBCLC

## 2017-04-19 NOTE — PROGRESS NOTES
[facility]       DAILY NOTE - POSTPARTUM DAY 1     SUBJECTIVE:     Pain controlled? Yes  Tolerating a regular diet? YES  Ambulating? YES  Voiding without difficulty? Yes    OBJECTIVE:  Vitals:    17 2300 17 0040 17 0825 17 1023   BP: 134/79 131/88 126/78    Pulse:       Resp:     Temp:  98.7  F (37.1  C) 97.8  F (36.6  C)    TempSrc:  Oral Oral    SpO2: 97% 97%     Weight:           Constitutional: healthy, alert and no distress    Abdomen:  Uterine fundus is firm, non-tender and at the level of the umbilicus     LABS:  Hemoglobin   Date Value Ref Range Status   2017 11.2 (L) 11.7 - 15.7 g/dL Final   2017 12.9 11.7 - 15.7 g/dL Final       ASSESSMENT:  Post-partum day #1 s/p   Pregnancy complicated by preeclampsia    Doing well.       PLAN:   Continue routine postpartum cares    Nga Deleon

## 2017-04-19 NOTE — PLAN OF CARE
Problem: Goal Outcome Summary  Goal: Goal Outcome Summary  Outcome: Improving  Pain is under control with tylenol and ibuprofen.  Bonding well with baby.  Encouraged pt to get up to bathroom often and change ice pack to help with the perineum swollen.  Assisted Pt up to bathroom this morning.  Pt denied feeling any dizziness when up.   is very helpful at bedside.  Pt is comfortably resting at this time.  Continues to monitor Pt.

## 2017-04-19 NOTE — PLAN OF CARE
Data: Haylie Bales transferred to room 428 via wheelchair at 0010. Baby transferred via parent's arms.  Action: Receiving unit notified of transfer: Yes. Patient and family notified of room change. Bedside report given to Elaine DANIEL RN at 0015. Belongings sent to receiving unit. Accompanied by Registered Nurse. Oriented patient to surroundings. Call light within reach. ID bands double-checked with receiving RN.  Response: Patient tolerated transfer and is stable.

## 2017-04-19 NOTE — ANESTHESIA POSTPROCEDURE EVALUATION
Patient: Haylie Schofield [facility]    * No procedures listed *    Diagnosis:* No pre-op diagnosis entered *  Diagnosis Additional Information: No value filed.    Anesthesia Type:  Epidural    Note:  Anesthesia Post Evaluation    Patient location during evaluation: floor  Patient participation: Able to fully participate in evaluation  Level of consciousness: awake and awake and alert  Pain management: adequate  Airway patency: patent  Cardiovascular status: acceptable  Respiratory status: acceptable  Hydration status: acceptable  PONV: none     Anesthetic complications: None          Last vitals:  Vitals:    04/19/17 1023 04/19/17 1100 04/19/17 1239   BP:      Pulse:      Resp: 18 16 16   Temp:      SpO2:            Electronically Signed By: Citlali Bradshaw  April 19, 2017  3:45 PM

## 2017-04-19 NOTE — PLAN OF CARE
Problem: Goal Outcome Summary  Goal: Goal Outcome Summary  Outcome: Improving  Patient admitted into room 428 per wheelchair accompanied by , , and L&D RNs. Oriented to room, call, light, safety measures and post-partum routines.  bands were verified. Vital signs stable, afebrile, perineum is swollen and RN is applying icepacks every 2-3 hours, voiding large amounts clear urine per white, able to rest, pain well controlled with medications, rates her pain 3/10, breast feeding  skin-to-skin on demand.  is here and is supportive.

## 2017-04-20 VITALS
BODY MASS INDEX: 33.25 KG/M2 | OXYGEN SATURATION: 97 % | RESPIRATION RATE: 16 BRPM | HEART RATE: 100 BPM | WEIGHT: 206 LBS | DIASTOLIC BLOOD PRESSURE: 79 MMHG | SYSTOLIC BLOOD PRESSURE: 127 MMHG | TEMPERATURE: 97.6 F

## 2017-04-20 LAB — COPATH REPORT: NORMAL

## 2017-04-20 PROCEDURE — 25000132 ZZH RX MED GY IP 250 OP 250 PS 637: Performed by: OBSTETRICS & GYNECOLOGY

## 2017-04-20 RX ORDER — IBUPROFEN 400 MG/1
400-800 TABLET, FILM COATED ORAL EVERY 6 HOURS PRN
Qty: 30 TABLET | Refills: 0 | Status: ON HOLD | OUTPATIENT
Start: 2017-04-20 | End: 2020-01-17

## 2017-04-20 RX ADMIN — SENNOSIDES AND DOCUSATE SODIUM 1 TABLET: 8.6; 5 TABLET ORAL at 09:09

## 2017-04-20 RX ADMIN — IBUPROFEN 800 MG: 400 TABLET ORAL at 12:04

## 2017-04-20 RX ADMIN — ACETAMINOPHEN 650 MG: 325 TABLET, FILM COATED ORAL at 12:04

## 2017-04-20 RX ADMIN — ACETAMINOPHEN 650 MG: 325 TABLET, FILM COATED ORAL at 05:12

## 2017-04-20 RX ADMIN — IBUPROFEN 800 MG: 400 TABLET ORAL at 05:12

## 2017-04-20 NOTE — PLAN OF CARE
Problem: Goal Outcome Summary  Goal: Goal Outcome Summary  Outcome: Adequate for Discharge Date Met:  04/20/17  Pt. stable, taking Tylenol and Ibuprofen for pain. Wearing IP and Tucks. Breastfeeding a baby girl, latch verified. Ready for d/c. D/C instructions reviewed with pt. and she verbalized understanding. Will room in with baby who can't d/c until tonight.

## 2017-04-20 NOTE — LACTATION NOTE
Follow up visit.  Infant nursing well, feeding during visit.  Pt feels comfortable with feeding.   Outpatient lactation resources reviewed with pt for use upon discharge.    Annette Thomas  RN, IBCLC

## 2017-04-20 NOTE — PROGRESS NOTES
Haylie cShofield [facility]  2017    S: pt doing well.  Pain well controlled,  Ambulating without difficulty.  Tolerating po intake.  Decreased lochia.  Breast/Bottle feeding.    O: /63  Pulse 100  Temp 97.9  F (36.6  C) (Oral)  Resp 16  Wt 93.4 kg (206 lb)  LMP 2016  SpO2 97%  Breastfeeding? Unknown  BMI 33.25 kg/m2    Recent Labs  Lab 17  0805 17  0800   HGB 11.2* 12.9     Abdomen: soft, non tender, fundus firm below the umbilicus  Ext: non tender, no edema or erythema    A/P: s/p  PPD #2  Doing well  Fontanez removed, was inserted yesterday due to swelling  Continue routine post partum care  Will see how voiding goes today  Possible discharge planning for today    Collene Almaraz

## 2017-04-20 NOTE — PLAN OF CARE
Problem: Goal Outcome Summary  Goal: Goal Outcome Summary  Outcome: No Change  VSS.  FF @ midline.  Breastfeeding going well, cluster feeding.  Tylenol and Ibuprofen controlling pain.  Ice packs for swollen perineum.  Fontanez patent with adequate UOP.  Will continue to monitor.

## 2017-04-20 NOTE — DISCHARGE INSTRUCTIONS

## 2017-04-20 NOTE — PLAN OF CARE
Problem: Goal Outcome Summary  Goal: Goal Outcome Summary  Outcome: Improving  Vital signs are stable.  Pain is under control with tylenol and ibuprofen. Bonding well with baby.  Ambulating independently.  Pt is encouraged to get up to bathroom often and change ice pack to help with the perineum swollen.  Breastfeeding is going well.  Plan to d/c white at 2100.  Will continues to monitor Pt.

## 2017-04-24 NOTE — DISCHARGE SUMMARY
Haylie Chanashlyn Bales  1985 4/17/2017 4/20/2017     Haylie Alcala   Home Medication Instructions JENNIFER:[id number]    Printed on:04/24/17 0723   Medication Information                      ferrous sulfate Dried 160 (50 FE) MG tablet  Take 1 tablet by mouth 2 times daily             ibuprofen (ADVIL/MOTRIN) 400 MG tablet  Take 1-2 tablets (400-800 mg) by mouth every 6 hours as needed for other (cramping)             Jgotwt-Cxvwgunyw-Jhts-Fish Oil (PRENATAL + COMPLETE MULTI PO)  Take 1 tablet by mouth daily                 Course of Care:   Had vaginal delivery after induction for pre-eclampsia.

## 2017-12-24 ENCOUNTER — HEALTH MAINTENANCE LETTER (OUTPATIENT)
Age: 32
End: 2017-12-24

## 2019-12-23 LAB — GROUP B STREP PCR: POSITIVE

## 2020-01-13 RX ORDER — NALOXONE HYDROCHLORIDE 0.4 MG/ML
.1-.4 INJECTION, SOLUTION INTRAMUSCULAR; INTRAVENOUS; SUBCUTANEOUS
Status: CANCELLED | OUTPATIENT
Start: 2020-01-13

## 2020-01-13 RX ORDER — OXYTOCIN/0.9 % SODIUM CHLORIDE 30/500 ML
100-340 PLASTIC BAG, INJECTION (ML) INTRAVENOUS CONTINUOUS PRN
Status: CANCELLED | OUTPATIENT
Start: 2020-01-13

## 2020-01-13 RX ORDER — OXYTOCIN 10 [USP'U]/ML
10 INJECTION, SOLUTION INTRAMUSCULAR; INTRAVENOUS
Status: CANCELLED | OUTPATIENT
Start: 2020-01-13

## 2020-01-13 RX ORDER — ACETAMINOPHEN 325 MG/1
650 TABLET ORAL EVERY 4 HOURS PRN
Status: CANCELLED | OUTPATIENT
Start: 2020-01-13

## 2020-01-13 RX ORDER — ONDANSETRON 2 MG/ML
4 INJECTION INTRAMUSCULAR; INTRAVENOUS EVERY 6 HOURS PRN
Status: CANCELLED | OUTPATIENT
Start: 2020-01-13

## 2020-01-13 RX ORDER — LIDOCAINE 40 MG/G
CREAM TOPICAL
Status: CANCELLED | OUTPATIENT
Start: 2020-01-13

## 2020-01-13 RX ORDER — OXYCODONE AND ACETAMINOPHEN 5; 325 MG/1; MG/1
1 TABLET ORAL
Status: CANCELLED | OUTPATIENT
Start: 2020-01-13

## 2020-01-13 RX ORDER — SODIUM CHLORIDE, SODIUM LACTATE, POTASSIUM CHLORIDE, CALCIUM CHLORIDE 600; 310; 30; 20 MG/100ML; MG/100ML; MG/100ML; MG/100ML
INJECTION, SOLUTION INTRAVENOUS CONTINUOUS
Status: CANCELLED | OUTPATIENT
Start: 2020-01-13

## 2020-01-13 RX ORDER — CARBOPROST TROMETHAMINE 250 UG/ML
250 INJECTION, SOLUTION INTRAMUSCULAR
Status: CANCELLED | OUTPATIENT
Start: 2020-01-13

## 2020-01-13 RX ORDER — PENICILLIN G POTASSIUM 5000000 [IU]/1
5 INJECTION, POWDER, FOR SOLUTION INTRAMUSCULAR; INTRAVENOUS ONCE
Status: CANCELLED | OUTPATIENT
Start: 2020-01-13

## 2020-01-13 RX ORDER — OXYTOCIN/0.9 % SODIUM CHLORIDE 30/500 ML
1-24 PLASTIC BAG, INJECTION (ML) INTRAVENOUS CONTINUOUS
Status: CANCELLED | OUTPATIENT
Start: 2020-01-13

## 2020-01-13 RX ORDER — METHYLERGONOVINE MALEATE 0.2 MG/ML
200 INJECTION INTRAVENOUS
Status: CANCELLED | OUTPATIENT
Start: 2020-01-13

## 2020-01-13 RX ORDER — IBUPROFEN 400 MG/1
800 TABLET, FILM COATED ORAL
Status: CANCELLED | OUTPATIENT
Start: 2020-01-13

## 2020-01-15 ENCOUNTER — ANESTHESIA (OUTPATIENT)
Dept: OBGYN | Facility: CLINIC | Age: 35
End: 2020-01-15
Payer: COMMERCIAL

## 2020-01-15 ENCOUNTER — HOSPITAL ENCOUNTER (INPATIENT)
Facility: CLINIC | Age: 35
LOS: 2 days | Discharge: HOME | End: 2020-01-17
Attending: OBSTETRICS & GYNECOLOGY | Admitting: OBSTETRICS & GYNECOLOGY
Payer: COMMERCIAL

## 2020-01-15 ENCOUNTER — ANESTHESIA EVENT (OUTPATIENT)
Dept: OBGYN | Facility: CLINIC | Age: 35
End: 2020-01-15
Payer: COMMERCIAL

## 2020-01-15 PROBLEM — Z36.89 ENCOUNTER FOR TRIAGE IN PREGNANT PATIENT: Status: ACTIVE | Noted: 2020-01-15

## 2020-01-15 LAB
ABO + RH BLD: NORMAL
ABO + RH BLD: NORMAL
ALT SERPL W P-5'-P-CCNC: 32 U/L (ref 0–50)
AST SERPL W P-5'-P-CCNC: 16 U/L (ref 0–45)
BASOPHILS # BLD AUTO: 0 10E9/L (ref 0–0.2)
BASOPHILS NFR BLD AUTO: 0.3 %
BLD GP AB SCN SERPL QL: NORMAL
BLOOD BANK CMNT PATIENT-IMP: NORMAL
CREAT SERPL-MCNC: 0.45 MG/DL (ref 0.52–1.04)
DIFFERENTIAL METHOD BLD: NORMAL
EOSINOPHIL # BLD AUTO: 0.1 10E9/L (ref 0–0.7)
EOSINOPHIL NFR BLD AUTO: 1.1 %
ERYTHROCYTE [DISTWIDTH] IN BLOOD BY AUTOMATED COUNT: 13.2 % (ref 10–15)
GFR SERPL CREATININE-BSD FRML MDRD: >90 ML/MIN/{1.73_M2}
HCT VFR BLD AUTO: 37.8 % (ref 35–47)
HGB BLD-MCNC: 12.5 G/DL (ref 11.7–15.7)
IMM GRANULOCYTES # BLD: 0.1 10E9/L (ref 0–0.4)
IMM GRANULOCYTES NFR BLD: 0.7 %
LYMPHOCYTES # BLD AUTO: 2.4 10E9/L (ref 0.8–5.3)
LYMPHOCYTES NFR BLD AUTO: 22.2 %
MCH RBC QN AUTO: 30 PG (ref 26.5–33)
MCHC RBC AUTO-ENTMCNC: 33.1 G/DL (ref 31.5–36.5)
MCV RBC AUTO: 91 FL (ref 78–100)
MONOCYTES # BLD AUTO: 0.6 10E9/L (ref 0–1.3)
MONOCYTES NFR BLD AUTO: 5.3 %
NEUTROPHILS # BLD AUTO: 7.5 10E9/L (ref 1.6–8.3)
NEUTROPHILS NFR BLD AUTO: 70.4 %
NRBC # BLD AUTO: 0 10*3/UL
NRBC BLD AUTO-RTO: 0 /100
PLATELET # BLD AUTO: 219 10E9/L (ref 150–450)
RBC # BLD AUTO: 4.17 10E12/L (ref 3.8–5.2)
SPECIMEN EXP DATE BLD: NORMAL
WBC # BLD AUTO: 10.6 10E9/L (ref 4–11)

## 2020-01-15 PROCEDURE — 85025 COMPLETE CBC W/AUTO DIFF WBC: CPT | Performed by: OBSTETRICS & GYNECOLOGY

## 2020-01-15 PROCEDURE — 82565 ASSAY OF CREATININE: CPT | Performed by: OBSTETRICS & GYNECOLOGY

## 2020-01-15 PROCEDURE — 25000128 H RX IP 250 OP 636: Performed by: ANESTHESIOLOGY

## 2020-01-15 PROCEDURE — 25800030 ZZH RX IP 258 OP 636: Performed by: ANESTHESIOLOGY

## 2020-01-15 PROCEDURE — 25800030 ZZH RX IP 258 OP 636: Performed by: OBSTETRICS & GYNECOLOGY

## 2020-01-15 PROCEDURE — 86900 BLOOD TYPING SEROLOGIC ABO: CPT | Performed by: OBSTETRICS & GYNECOLOGY

## 2020-01-15 PROCEDURE — 37000011 ZZH ANESTHESIA WARD SERVICE

## 2020-01-15 PROCEDURE — 25000132 ZZH RX MED GY IP 250 OP 250 PS 637: Performed by: OBSTETRICS & GYNECOLOGY

## 2020-01-15 PROCEDURE — 00HU33Z INSERTION OF INFUSION DEVICE INTO SPINAL CANAL, PERCUTANEOUS APPROACH: ICD-10-PCS | Performed by: OBSTETRICS & GYNECOLOGY

## 2020-01-15 PROCEDURE — 3E0S3BZ INTRODUCTION OF ANESTHETIC AGENT INTO EPIDURAL SPACE, PERCUTANEOUS APPROACH: ICD-10-PCS | Performed by: OBSTETRICS & GYNECOLOGY

## 2020-01-15 PROCEDURE — 25000128 H RX IP 250 OP 636: Performed by: OBSTETRICS & GYNECOLOGY

## 2020-01-15 PROCEDURE — G0463 HOSPITAL OUTPT CLINIC VISIT: HCPCS

## 2020-01-15 PROCEDURE — 86850 RBC ANTIBODY SCREEN: CPT | Performed by: OBSTETRICS & GYNECOLOGY

## 2020-01-15 PROCEDURE — 84460 ALANINE AMINO (ALT) (SGPT): CPT | Performed by: OBSTETRICS & GYNECOLOGY

## 2020-01-15 PROCEDURE — 86901 BLOOD TYPING SEROLOGIC RH(D): CPT | Performed by: OBSTETRICS & GYNECOLOGY

## 2020-01-15 PROCEDURE — 0KQM0ZZ REPAIR PERINEUM MUSCLE, OPEN APPROACH: ICD-10-PCS | Performed by: OBSTETRICS & GYNECOLOGY

## 2020-01-15 PROCEDURE — 86780 TREPONEMA PALLIDUM: CPT | Performed by: OBSTETRICS & GYNECOLOGY

## 2020-01-15 PROCEDURE — 84450 TRANSFERASE (AST) (SGOT): CPT | Performed by: OBSTETRICS & GYNECOLOGY

## 2020-01-15 PROCEDURE — 36415 COLL VENOUS BLD VENIPUNCTURE: CPT | Performed by: OBSTETRICS & GYNECOLOGY

## 2020-01-15 PROCEDURE — 12000035 ZZH R&B POSTPARTUM

## 2020-01-15 PROCEDURE — 72200001 ZZH LABOR CARE VAGINAL DELIVERY SINGLE

## 2020-01-15 RX ORDER — NALBUPHINE HYDROCHLORIDE 10 MG/ML
2.5-5 INJECTION, SOLUTION INTRAMUSCULAR; INTRAVENOUS; SUBCUTANEOUS EVERY 6 HOURS PRN
Status: DISCONTINUED | OUTPATIENT
Start: 2020-01-15 | End: 2020-01-17 | Stop reason: HOSPADM

## 2020-01-15 RX ORDER — NALOXONE HYDROCHLORIDE 0.4 MG/ML
.1-.4 INJECTION, SOLUTION INTRAMUSCULAR; INTRAVENOUS; SUBCUTANEOUS
Status: DISCONTINUED | OUTPATIENT
Start: 2020-01-15 | End: 2020-01-15

## 2020-01-15 RX ORDER — CARBOPROST TROMETHAMINE 250 UG/ML
250 INJECTION, SOLUTION INTRAMUSCULAR
Status: DISCONTINUED | OUTPATIENT
Start: 2020-01-15 | End: 2020-01-17 | Stop reason: HOSPADM

## 2020-01-15 RX ORDER — OXYTOCIN/0.9 % SODIUM CHLORIDE 30/500 ML
340 PLASTIC BAG, INJECTION (ML) INTRAVENOUS CONTINUOUS PRN
Status: DISCONTINUED | OUTPATIENT
Start: 2020-01-15 | End: 2020-01-17 | Stop reason: HOSPADM

## 2020-01-15 RX ORDER — LEVOTHYROXINE SODIUM 88 UG/1
88 TABLET ORAL DAILY
Status: DISCONTINUED | OUTPATIENT
Start: 2020-01-16 | End: 2020-01-17 | Stop reason: HOSPADM

## 2020-01-15 RX ORDER — SODIUM CHLORIDE, SODIUM LACTATE, POTASSIUM CHLORIDE, CALCIUM CHLORIDE 600; 310; 30; 20 MG/100ML; MG/100ML; MG/100ML; MG/100ML
INJECTION, SOLUTION INTRAVENOUS CONTINUOUS
Status: DISCONTINUED | OUTPATIENT
Start: 2020-01-15 | End: 2020-01-17 | Stop reason: HOSPADM

## 2020-01-15 RX ORDER — LEVOTHYROXINE SODIUM 88 UG/1
88 TABLET ORAL DAILY
Status: ON HOLD | COMMUNITY
End: 2020-01-17

## 2020-01-15 RX ORDER — IBUPROFEN 400 MG/1
800 TABLET, FILM COATED ORAL EVERY 6 HOURS PRN
Status: DISCONTINUED | OUTPATIENT
Start: 2020-01-15 | End: 2020-01-17 | Stop reason: HOSPADM

## 2020-01-15 RX ORDER — ONDANSETRON 2 MG/ML
4 INJECTION INTRAMUSCULAR; INTRAVENOUS EVERY 6 HOURS PRN
Status: DISCONTINUED | OUTPATIENT
Start: 2020-01-15 | End: 2020-01-17 | Stop reason: HOSPADM

## 2020-01-15 RX ORDER — HYDROCORTISONE 2.5 %
CREAM (GRAM) TOPICAL 3 TIMES DAILY PRN
Status: DISCONTINUED | OUTPATIENT
Start: 2020-01-15 | End: 2020-01-17 | Stop reason: HOSPADM

## 2020-01-15 RX ORDER — OXYTOCIN 10 [USP'U]/ML
10 INJECTION, SOLUTION INTRAMUSCULAR; INTRAVENOUS
Status: DISCONTINUED | OUTPATIENT
Start: 2020-01-15 | End: 2020-01-17 | Stop reason: HOSPADM

## 2020-01-15 RX ORDER — LANOLIN 100 %
OINTMENT (GRAM) TOPICAL
Status: DISCONTINUED | OUTPATIENT
Start: 2020-01-15 | End: 2020-01-17 | Stop reason: HOSPADM

## 2020-01-15 RX ORDER — LIDOCAINE HYDROCHLORIDE AND EPINEPHRINE 15; 5 MG/ML; UG/ML
3 INJECTION, SOLUTION EPIDURAL
Status: DISCONTINUED | OUTPATIENT
Start: 2020-01-15 | End: 2020-01-17 | Stop reason: HOSPADM

## 2020-01-15 RX ORDER — IBUPROFEN 400 MG/1
800 TABLET, FILM COATED ORAL
Status: DISCONTINUED | OUTPATIENT
Start: 2020-01-15 | End: 2020-01-15

## 2020-01-15 RX ORDER — MISOPROSTOL 200 UG/1
800 TABLET ORAL
Status: DISCONTINUED | OUTPATIENT
Start: 2020-01-15 | End: 2020-01-17 | Stop reason: HOSPADM

## 2020-01-15 RX ORDER — OXYCODONE AND ACETAMINOPHEN 5; 325 MG/1; MG/1
1 TABLET ORAL
Status: DISCONTINUED | OUTPATIENT
Start: 2020-01-15 | End: 2020-01-17 | Stop reason: HOSPADM

## 2020-01-15 RX ORDER — NALOXONE HYDROCHLORIDE 0.4 MG/ML
.1-.4 INJECTION, SOLUTION INTRAMUSCULAR; INTRAVENOUS; SUBCUTANEOUS
Status: DISCONTINUED | OUTPATIENT
Start: 2020-01-15 | End: 2020-01-17 | Stop reason: HOSPADM

## 2020-01-15 RX ORDER — METHYLERGONOVINE MALEATE 0.2 MG/ML
200 INJECTION INTRAVENOUS
Status: DISCONTINUED | OUTPATIENT
Start: 2020-01-15 | End: 2020-01-17 | Stop reason: HOSPADM

## 2020-01-15 RX ORDER — ASPIRIN 81 MG/1
81 TABLET, CHEWABLE ORAL DAILY
Status: ON HOLD | COMMUNITY
End: 2020-01-17

## 2020-01-15 RX ORDER — ACETAMINOPHEN 325 MG/1
650 TABLET ORAL EVERY 4 HOURS PRN
Status: DISCONTINUED | OUTPATIENT
Start: 2020-01-15 | End: 2020-01-15

## 2020-01-15 RX ORDER — PENICILLIN G POTASSIUM 5000000 [IU]/1
5 INJECTION, POWDER, FOR SOLUTION INTRAMUSCULAR; INTRAVENOUS ONCE
Status: COMPLETED | OUTPATIENT
Start: 2020-01-15 | End: 2020-01-15

## 2020-01-15 RX ORDER — OXYTOCIN/0.9 % SODIUM CHLORIDE 30/500 ML
100 PLASTIC BAG, INJECTION (ML) INTRAVENOUS CONTINUOUS
Status: DISCONTINUED | OUTPATIENT
Start: 2020-01-15 | End: 2020-01-17 | Stop reason: HOSPADM

## 2020-01-15 RX ORDER — BISACODYL 10 MG
10 SUPPOSITORY, RECTAL RECTAL DAILY PRN
Status: DISCONTINUED | OUTPATIENT
Start: 2020-01-17 | End: 2020-01-17 | Stop reason: HOSPADM

## 2020-01-15 RX ORDER — OXYTOCIN/0.9 % SODIUM CHLORIDE 30/500 ML
100-340 PLASTIC BAG, INJECTION (ML) INTRAVENOUS CONTINUOUS PRN
Status: DISCONTINUED | OUTPATIENT
Start: 2020-01-15 | End: 2020-01-15

## 2020-01-15 RX ORDER — EPHEDRINE SULFATE 50 MG/ML
5 INJECTION, SOLUTION INTRAMUSCULAR; INTRAVENOUS; SUBCUTANEOUS
Status: DISCONTINUED | OUTPATIENT
Start: 2020-01-15 | End: 2020-01-17 | Stop reason: HOSPADM

## 2020-01-15 RX ORDER — DOCUSATE SODIUM 100 MG/1
100 CAPSULE, LIQUID FILLED ORAL 2 TIMES DAILY
Status: DISCONTINUED | OUTPATIENT
Start: 2020-01-15 | End: 2020-01-17 | Stop reason: HOSPADM

## 2020-01-15 RX ORDER — FENTANYL/BUPIVACAINE/NS/PF 2-1250MCG
12 PLASTIC BAG, INJECTION (ML) INJECTION CONTINUOUS
Status: DISCONTINUED | OUTPATIENT
Start: 2020-01-15 | End: 2020-01-17 | Stop reason: HOSPADM

## 2020-01-15 RX ORDER — ROPIVACAINE HYDROCHLORIDE 2 MG/ML
10 INJECTION, SOLUTION EPIDURAL; INFILTRATION; PERINEURAL ONCE
Status: COMPLETED | OUTPATIENT
Start: 2020-01-15 | End: 2020-01-15

## 2020-01-15 RX ORDER — ONDANSETRON 4 MG/1
4 TABLET, ORALLY DISINTEGRATING ORAL EVERY 6 HOURS PRN
Status: DISCONTINUED | OUTPATIENT
Start: 2020-01-15 | End: 2020-01-17 | Stop reason: HOSPADM

## 2020-01-15 RX ORDER — LIDOCAINE 40 MG/G
CREAM TOPICAL
Status: DISCONTINUED | OUTPATIENT
Start: 2020-01-15 | End: 2020-01-17 | Stop reason: HOSPADM

## 2020-01-15 RX ORDER — FENTANYL CITRATE 50 UG/ML
50-100 INJECTION, SOLUTION INTRAMUSCULAR; INTRAVENOUS
Status: DISCONTINUED | OUTPATIENT
Start: 2020-01-15 | End: 2020-01-17 | Stop reason: HOSPADM

## 2020-01-15 RX ORDER — ACETAMINOPHEN 325 MG/1
650 TABLET ORAL EVERY 4 HOURS PRN
Status: DISCONTINUED | OUTPATIENT
Start: 2020-01-15 | End: 2020-01-17 | Stop reason: HOSPADM

## 2020-01-15 RX ADMIN — ACETAMINOPHEN 650 MG: 325 TABLET, FILM COATED ORAL at 21:09

## 2020-01-15 RX ADMIN — IBUPROFEN 800 MG: 400 TABLET ORAL at 21:09

## 2020-01-15 RX ADMIN — DOCUSATE SODIUM 100 MG: 100 CAPSULE, LIQUID FILLED ORAL at 21:09

## 2020-01-15 RX ADMIN — BUPIVACAINE HYDROCHLORIDE 12 ML/HR: 5 INJECTION, SOLUTION EPIDURAL; INTRACAUDAL at 16:34

## 2020-01-15 RX ADMIN — PENICILLIN G POTASSIUM 5 MILLION UNITS: 5000000 POWDER, FOR SOLUTION INTRAMUSCULAR; INTRAPLEURAL; INTRATHECAL; INTRAVENOUS at 15:28

## 2020-01-15 RX ADMIN — SODIUM CHLORIDE, POTASSIUM CHLORIDE, SODIUM LACTATE AND CALCIUM CHLORIDE 1000 ML: 600; 310; 30; 20 INJECTION, SOLUTION INTRAVENOUS at 15:27

## 2020-01-15 RX ADMIN — ROPIVACAINE HYDROCHLORIDE 10 ML: 2 INJECTION, SOLUTION EPIDURAL; INFILTRATION at 16:00

## 2020-01-15 RX ADMIN — SODIUM CHLORIDE, POTASSIUM CHLORIDE, SODIUM LACTATE AND CALCIUM CHLORIDE: 600; 310; 30; 20 INJECTION, SOLUTION INTRAVENOUS at 16:34

## 2020-01-15 ASSESSMENT — ACTIVITIES OF DAILY LIVING (ADL)
TRANSFERRING: 0-->INDEPENDENT
DRESS: 0-->INDEPENDENT
RETIRED_EATING: 0-->INDEPENDENT
BATHING: 0-->INDEPENDENT
RETIRED_COMMUNICATION: 0-->UNDERSTANDS/COMMUNICATES WITHOUT DIFFICULTY
SWALLOWING: 0-->SWALLOWS FOODS/LIQUIDS WITHOUT DIFFICULTY
AMBULATION: 0-->INDEPENDENT
COGNITION: 0 - NO COGNITION ISSUES REPORTED
TOILETING: 0-->INDEPENDENT
FALL_HISTORY_WITHIN_LAST_SIX_MONTHS: NO

## 2020-01-15 ASSESSMENT — MIFFLIN-ST. JEOR: SCORE: 1651.62

## 2020-01-15 NOTE — OP NOTE
SURGEON: Major Bernal MD  PREOPERATIVE DIAGNOSIS: Single intrauterine pregnancy at 39w1d, Active labor  POSTOPERATIVE DIAGNOSIS: Same, delivered  OPERATION PERFORMED: Normal spontaneous vaginal delivery  ANESTHESIA: Epidural  EBL: 350 mL  FLUIDS: Lactated Ringers at 125 mL/hour  URINE OUTPUT: Not measured  DRAINS: None  SPECIMENS: Cord blood   COMPLICATIONS: None  FINDINGS: Viable male infant weighing PENDING grams with APGARs of 8 and 9 at 1 and 5 minutes, respectively.  CONDITION: Both mother and infant stable in the immediate postpartum period. The patient remained in labor and delivery for recovery and the infant was kept in the room with her.  INDICATIONS: The patient is a 34 year old  who presented at 39w1d estimated gestational age in active labor.  She had a single dose of PCN for GBS positive status.  She spontaneously ruptured for clear fluid at complete dilation, labored down for almost an hour, and desired to push.  Her prenatal course was complicated with hypothyroidism, Hx PET, GBS positive, and failed 1hr GTT and normal 3hr GTT.    OPERATION: The patient progressed to complete/complete/+ 2 station and pushed for 20 minutes to deliver a viable male infant weighing PENDING grams with APGARs of 8 and 9 at one and five minutes, respectively via  over an intact perineum under epidural anesthesia. The baby delivered in left occiput anterior position. There was no nuchal cord. The remainder of the body delivered without incident. Nose and mouth were bulb suctioned and the cord was clamped times two and cut. The baby was placed on the maternal abdomen. Placenta, membranes, and a three vessel cord delivered spontaneously and intact. Inspection of the perineum revealed a second degree perineal laceration that was repaired with 2-0 Vicryl in routine fashion with excellent hemostasis. The perineaum was oversewn to approximate the edges with a 4-0 Vicryl SH in a subcuticular fashion as the edges did  not initially approximate. The rectum, sulci, and cervix were inspected and noted to be intact. The fundus was firm with IV oxytocin and fundal massage. There were no sponges left in the vagina.  Needle and sponge count were correct.  Mother and baby were stable in the immediate post partum period.

## 2020-01-15 NOTE — H&P
CC: CTX  HPI: 35yo  at 39w1d GA c EDC 20 by 1st Tri  p/w CTX.  She was 7cm at admission and under went epidural.  She is GBS positive and received a single dose of PCN when I arrived.  No recent illnesses.    PNI: Hypothyroidism, Hx PET (ASA), Failed 1hr GTT & passed 3hr GTT  PNL: A+, Rub immune, GBS positive, otherwise WNL  PNC: Reg visits since 1st Tri, occasional elevated BPs but not diagnosed with gestational HTN, TWG 40lb, on ASA until 36 wks    PObHx: G1 2017  7'13'' M PET; G2 current  PGynHx: Menarche 13, reg menses, No STD, no Abn Paps, known intramural leiomyoma, Left oophorectomy due to cyst  PSHx: Left oophorectomy, oral surgery  PMHx: None  Meds: PNV, Synthroid 88mcg  Allergies: Sulfa  PSocHx: No Tob, EtOH, drugs  PFamHx: non-contributory    PE - see nursing notes for vitals.    Comfortable with epidural.  SVE 10/100/+1 SROM with check for clear fluid.    Fetal Cat 1, difficult to monitor CTX    Assessment: Multigravida at term in Stage 2 of labor  Plan: Allow to passively descend then begin pushing within an hour.  Anticipate .  S/p PCN x1 for GBS+.  Epidural.

## 2020-01-15 NOTE — ANESTHESIA PREPROCEDURE EVALUATION
Anesthesia Pre-Procedure Evaluation    Patient: Haylie Bales   MRN: [id number] : 1985          Preoperative Diagnosis: * No pre-op diagnosis entered *    * No procedures listed *    Past Medical History:   Diagnosis Date     Achilles tendonitis      Anemia      Hypertension     elevated since      No active medical problems      Pregnancy      Thyroid disease      Past Surgical History:   Procedure Laterality Date     AS REMOVAL OF OVARIAN CYST(S)       HC TOOTH EXTRACTION W/FORCEP       LAPAROSCOPIC SALPINGO-OOPHORECTOMY Left 12/15/2016    Procedure: LAPAROSCOPIC SALPINGO-OOPHORECTOMY;  Surgeon: Jose Mendosa MD;  Location:  OR     [facility] BILATER[facility]         Anesthesia Evaluation     .             ROS/MED HX    ENT/Pulmonary:      (-) asthma   Neurologic:       Cardiovascular:        (-) PIH   METS/Exercise Tolerance:     Hematologic:         Musculoskeletal:         GI/Hepatic:     (+) GERD       Renal/Genitourinary:         Endo:     (+) thyroid problem .      Psychiatric:         Infectious Disease:         Malignancy:         Other:                       (-) no pre-eclampsia and gestational diabetes          Physical Exam      Airway   Mallampati: II  TM distance: > 3 FB  Neck ROM: full  Mouth opening: > 3 cm    Dental     Cardiovascular       Pulmonary             Lab Results   Component Value Date    WBC 10.6 01/15/2020    HGB 12.5 01/15/2020    HCT 37.8 01/15/2020     01/15/2020     2017    POTASSIUM 4.1 2017    CHLORIDE 104 2017    CO2 23 2017    BUN 8 2017    CR 0.40 (L) 2017     (H) 2017    ADDIS 9.0 2017    ALBUMIN 2.7 (L) 2017    PROTTOTAL 7.0 2017    ALT 33 2017    AST 15 2017    ALKPHOS 125 2017    BILITOTAL 0.2 2017    TSH 3.86 2013       Preop Vitals  BP Readings from Last 3 Encounters:   01/15/20 139/87   17 127/79   17 127/74    Pulse Readings  "from Last 3 Encounters:   04/19/17 100   01/09/17 90   12/14/16 93      Resp Readings from Last 3 Encounters:   04/20/17 16   01/09/17 16   12/15/16 18    SpO2 Readings from Last 3 Encounters:   04/19/17 97%   01/09/17 97%   12/15/16 99%      Temp Readings from Last 1 Encounters:   01/15/20 36.5  C (97.7  F) (Temporal)    Ht Readings from Last 1 Encounters:   01/15/20 1.676 m (5' 6\")      Wt Readings from Last 1 Encounters:   01/15/20 93 kg (205 lb)    Estimated body mass index is 33.09 kg/m  as calculated from the following:    Height as of this encounter: 1.676 m (5' 6\").    Weight as of this encounter: 93 kg (205 lb).       Anesthesia Plan      History & Physical Review  History and physical reviewed and following examination; no interval change.    ASA Status:  2 .        Plan for Spinal          Postoperative Care      Consents  Anesthetic plan, risks, benefits and alternatives discussed with: .  Use of blood products discussed: No .   .                Shakeel Serna MD  "

## 2020-01-15 NOTE — PLAN OF CARE
Data: Patient presented to [facility] at 1415.   Reason for maternal/fetal assessment per patient is Rule Out Labor  .  Patient is a . Prenatal record reviewed.      OB History    Para Term  AB Living   2 1 1 0 0 1   SAB TAB Ectopic Multiple Live Births   0 0 0 0 1      # Outcome Date GA Lbr Mic/2nd Weight Sex Delivery Anes PTL Lv   2 Current            1 Term 17 37w3d 06:00 / 03:16 3.555 kg (7 lb 13.4 oz) M Vag-Spont   KALIA      Name: CAMILO MANN,BABY1 SHREYA      Apgar1: 8  Apgar5: 9   . Medical history:   Past Medical History:   Diagnosis Date     Achilles tendonitis      Anemia      Hypertension     elevated since      No active medical problems      Pregnancy      Thyroid disease    . Gestational Age 39w1d. VSS. Fetal movement present. Patient denies  vaginal discharge, pelvic pressure, UTI symptoms, GI problems, bloody show, vaginal bleeding, edema, headache, visual disturbances, epigastric or URQ pain, rupture of membranes. Support persons, Serge present.  Action: Verbal consent for EFM. Triage assessment completed. EFM applied for fetal assessment. Uterine assessment completed and angelica every 2-6 minutes. Fetal assessment: Presumed adequate fetal oxygenation documented (see flow record).   Response: Dr. Bautista informed of pts dilation of 6-7cm, /87.  Plan per provider is admit pt into labor and delivery, start IV for +GBS, preeclampsia labs and pt may have labor epidural as requested by pt.   Patient verbalized agreement with plan. Patient transferred to room 215 ambulatory, oriented to room and call light. Report given to Kasey Aaron RN at 8720.

## 2020-01-15 NOTE — ANESTHESIA PROCEDURE NOTES
Peripheral nerve/Neuraxial procedure note : epidural catheter  Pre-Procedure  Performed by Shakeel Serna MD  Location: OB      Pre-Anesthestic Checklist: patient identified, IV checked, risks and benefits discussed, informed consent, monitors and equipment checked, pre-op evaluation and at physician/surgeon's request    Timeout  Correct Patient: Yes   Correct Procedure: Yes   Correct Site: Yes   Correct Laterality: N/A   Correct Position: Yes   Site Marked: N/A   .   Procedure Documentation    .    Procedure: epidural catheter, .   Patient Position:sitting Insertion Site:L2-3  (midline approach) Injection technique: LORT saline   Local skin infiltrated with mL of 1% lidocaine.  YOSVANY at 5 cm    Patient Prep/Sterile Barriers; mask, sterile gloves, povidone-iodine 7.5% surgical scrub, patient draped.  .  Needle: Touhy needle   Needle Gauge: 17.    Needle Length (Inches) 5   # of attempts: 1 and # of redirects: : 0. .    Catheter: 19 G . .  Catheter threaded easily  5 cm epidural space.  10 cm at skin.   .    Assessment/Narrative  Paresthesias: No.  .  .  No aspiration negative for heme or CSF  . Test dose of 3 mL lidocaine 1.5% w/ 1:200,000 epinephrine at. Test dose negative for signs of intravascular, subdural or intrathecal injection. Comments:  Pt tolerated well.    Taped sterile and secure.   FHTs stable post-procedure.   No complications.

## 2020-01-16 LAB — T PALLIDUM AB SER QL: NONREACTIVE

## 2020-01-16 PROCEDURE — 25000132 ZZH RX MED GY IP 250 OP 250 PS 637: Performed by: OBSTETRICS & GYNECOLOGY

## 2020-01-16 PROCEDURE — 12000035 ZZH R&B POSTPARTUM

## 2020-01-16 RX ADMIN — DOCUSATE SODIUM 100 MG: 100 CAPSULE, LIQUID FILLED ORAL at 19:55

## 2020-01-16 RX ADMIN — ACETAMINOPHEN 650 MG: 325 TABLET, FILM COATED ORAL at 04:22

## 2020-01-16 RX ADMIN — ACETAMINOPHEN 650 MG: 325 TABLET, FILM COATED ORAL at 10:22

## 2020-01-16 RX ADMIN — DOCUSATE SODIUM 100 MG: 100 CAPSULE, LIQUID FILLED ORAL at 08:03

## 2020-01-16 RX ADMIN — IBUPROFEN 800 MG: 400 TABLET ORAL at 22:26

## 2020-01-16 RX ADMIN — IBUPROFEN 800 MG: 400 TABLET ORAL at 04:21

## 2020-01-16 RX ADMIN — ACETAMINOPHEN 650 MG: 325 TABLET, FILM COATED ORAL at 22:26

## 2020-01-16 RX ADMIN — IBUPROFEN 800 MG: 400 TABLET ORAL at 16:22

## 2020-01-16 RX ADMIN — IBUPROFEN 800 MG: 400 TABLET ORAL at 10:22

## 2020-01-16 RX ADMIN — ACETAMINOPHEN 650 MG: 325 TABLET, FILM COATED ORAL at 16:22

## 2020-01-16 RX ADMIN — LEVOTHYROXINE SODIUM 88 MCG: 88 TABLET ORAL at 08:03

## 2020-01-16 NOTE — PLAN OF CARE
Pt. admitted from L&D  via wheel chair and transferred to bed with stand by assist. Pt. arrived with baby and was accompanied by Serge (spouse)  and arrived with personal belongings. Report was taken from S in L&D. Blood pressure was elevated at 144/92 but patient was in pain  Pain meds given. Fundus is firm and midline.  Vaginal bleeding is light .  SL in L forearm..  Pt. oriented to the room and call light system.

## 2020-01-16 NOTE — PLAN OF CARE
Took over patient cares at 1530.  SVE done at this time. Patient is ready and waiting for an epidural.  At 1550 Dr. Serna in room for an epidural. Time out done.  At 1605 Pt to L tilt. Epidural placement done and already feeling some relief. At 1620 Dr. Bernal here and did SVE . Patient complete and SROM of clear fluid. At 1650 Patient feeling pressure. At this time laboring down with a peanut ball. At 1710 Presusre again and now baby is +2. Dr. Bernal iin room and straight cath for 100cc. Pt started to push at 1715. At 1731 delivery of a viable male. Apgars 8 and 9. Placenta delivered at 1742. Will monitor patient during delivery recovery. See Dr. Bernal delivery note for delivery details.

## 2020-01-16 NOTE — PLAN OF CARE
VSS on RA.  Fundus firm, midline, U/U.  Scant lochia rubra.  Voiding adequately.  Up independently.  Pain managed w/ibuprofen and Tylenol.  Breastfeeding well.  Independent w/infant cares.  Spouse at bedside.  Nursing to continue to monitor.

## 2020-01-16 NOTE — LACTATION NOTE
Initial visit with Haylie, RAOULB , grandmother and Baby boy.    Breastfeeding general information reviewed.   Advised to breastfeed exclusively, on demand, avoid pacifiers, bottles and formula unless medically indicated.  Encouraged rooming in, skin to skin, feeding on demand 8-12x/day or sooner if baby cues.  Explained benefits of holding and skin to skin.  Encouraged lots of skin to skin. Instructed on hand expression.   Continues to nurse well per mom.  Has a [employer]ra pump for home and will follow up with [facility].    Baby placed to breast and latched on with lips flanged.  Held nipple in mouth and no suckling noted.  Hand expressed multiple drops of colostrum.    her almost 3 year old for 7 months and had to supplement and pump right away with him due to increased bilirubin level.  Questions answered regarding pumping and physiology of milk supply and production.  No further questions at this time.   Will follow as needed.   Paola LONGORIAN, RN, PHN, RNC-MNN, IBCLC

## 2020-01-16 NOTE — PLAN OF CARE
Vital signs stable. Postpartum assessment WDL. Pain controlled with Tylenol and Ibuprofen. Patient voiding without difficulty.  IV access d/dave. Breastfeeding on cue and baby is latching well.   Patient and infant bonding well. Will continue with current plan of care.  Father present and involved.  Baby has had one void - awaiting first stool.

## 2020-01-16 NOTE — PROGRESS NOTES
[facility]       DAILY NOTE - POSTPARTUM DAY 1     SUBJECTIVE:     Pain controlled? Yes  Tolerating a regular diet? YES  Ambulating? YES  Voiding without difficulty? Yes  Breast feeding is going well.     OBJECTIVE:  Vitals:    20 0421 20 0500 20 0730 20 0809   BP:   (!) 142/95 (!) 145/86   BP Location:       Pulse:   76    Resp:     Temp:   97.8  F (36.6  C)    TempSrc:   Oral    SpO2:       Weight:       Height:           Constitutional: healthy, alert and no distress    Abdomen:  Uterine fundus is firm, non-tender and at the level of the umbilicus     Ext: no edema, no CT      LABS:  Hemoglobin   Date Value Ref Range Status   01/15/2020 12.5 11.7 - 15.7 g/dL Final   2017 11.2 (L) 11.7 - 15.7 g/dL Final       ASSESSMENT:  Post-partum day #1 s/p   Pregnancy complicated by gestational hypertension    Doing well.  No excessive bleeding       PLAN:   Ambulation encouraged  Pain control measures as needed  Continue routine postpartum cares  Monitor BP closely - will start meds for BP > 150/95  Anticipate discharge tomorrow    Jumana Bautista MD

## 2020-01-16 NOTE — PLAN OF CARE
Data: Haylie Bales transferred to [facility] via wheelchair at 2105. Baby transferred via parent's arms.  Action: Receiving unit notified of transfer: Yes. Patient and family notified of room change. Report given to Arelis GEORGES RN at 2105. Belongings sent to receiving unit. Accompanied by Registered Nurse. Oriented patient to surroundings. Call light within reach. ID bands double-checked with receiving RN.  Response: Patient tolerated transfer and is stable.

## 2020-01-16 NOTE — PLAN OF CARE
Mildly elevated blood pressures 142/95,145/86, aware,denies headache or vision change,voiding with out difficulty,pain control with tylenol&ibuprofen,baby breast feeding well.Will continue to monitor.

## 2020-01-17 VITALS
HEIGHT: 66 IN | WEIGHT: 205 LBS | HEART RATE: 80 BPM | OXYGEN SATURATION: 100 % | BODY MASS INDEX: 32.95 KG/M2 | TEMPERATURE: 98.1 F | SYSTOLIC BLOOD PRESSURE: 141 MMHG | RESPIRATION RATE: 16 BRPM | DIASTOLIC BLOOD PRESSURE: 80 MMHG

## 2020-01-17 PROCEDURE — 25000132 ZZH RX MED GY IP 250 OP 250 PS 637: Performed by: OBSTETRICS & GYNECOLOGY

## 2020-01-17 RX ORDER — ACETAMINOPHEN 325 MG/1
650 TABLET ORAL EVERY 4 HOURS PRN
COMMUNITY
Start: 2020-01-17

## 2020-01-17 RX ORDER — LEVOTHYROXINE SODIUM 75 UG/1
75 TABLET ORAL DAILY
Qty: 30 TABLET | Refills: 0 | Status: SHIPPED | OUTPATIENT
Start: 2020-01-17

## 2020-01-17 RX ORDER — IBUPROFEN 800 MG/1
800 TABLET, FILM COATED ORAL EVERY 6 HOURS PRN
Qty: 30 TABLET | Refills: 0 | Status: SHIPPED | OUTPATIENT
Start: 2020-01-17

## 2020-01-17 RX ORDER — DOCUSATE SODIUM 100 MG/1
100 CAPSULE, LIQUID FILLED ORAL 2 TIMES DAILY PRN
Qty: 30 CAPSULE | Refills: 1 | Status: SHIPPED | OUTPATIENT
Start: 2020-01-17

## 2020-01-17 RX ADMIN — ACETAMINOPHEN 650 MG: 325 TABLET, FILM COATED ORAL at 09:54

## 2020-01-17 RX ADMIN — IBUPROFEN 800 MG: 400 TABLET ORAL at 09:54

## 2020-01-17 RX ADMIN — LEVOTHYROXINE SODIUM 88 MCG: 88 TABLET ORAL at 09:55

## 2020-01-17 RX ADMIN — IBUPROFEN 800 MG: 400 TABLET ORAL at 04:02

## 2020-01-17 RX ADMIN — ACETAMINOPHEN 650 MG: 325 TABLET, FILM COATED ORAL at 04:01

## 2020-01-17 RX ADMIN — DOCUSATE SODIUM 100 MG: 100 CAPSULE, LIQUID FILLED ORAL at 09:54

## 2020-01-17 NOTE — PLAN OF CARE
Vital signs and assessments wnl. Patient is up independently, voiding , pain  controlled with  medications given as prescribed. Encouraged to continue to ambulate as able and void frequently. Patient is bonding well with infant.

## 2020-01-17 NOTE — ANESTHESIA POSTPROCEDURE EVALUATION
Patient: Haylie [facility]    * No procedures listed *    Diagnosis:* No pre-op diagnosis entered *  Diagnosis Additional Information: No value filed.    Anesthesia Type:  No value filed.    Note:  Anesthesia Post Evaluation    Patient location during evaluation: Floor  Patient participation: Able to fully participate in evaluation  Level of consciousness: awake and alert  Cardiovascular status: acceptable  Respiratory status: acceptable       Comments: Patient satisfied with epidural experience.  She denies lower extremity numbness and weakness and has had no difficulty with ambulation.  She endorses mild pain/tenderness at epidural site.            Last vitals:  Vitals:    01/16/20 0809 01/16/20 1231 01/16/20 1622   BP: (!) 145/86 131/77 130/84   Pulse:  83 81   Resp:  16 16   Temp:   36.6  C (97.8  F)   SpO2:            Electronically Signed By: Erik Talbert MD  January 16, 2020  7:42 PM

## 2020-01-17 NOTE — PROGRESS NOTES
[facility]       DAILY NOTE - POSTPARTUM DAY 2     SUBJECTIVE:     Pain controlled? Yes  Tolerating a regular diet? YES  Ambulating? YES  Voiding without difficulty? Yes  Breast feeding. Baby doing well.    OBJECTIVE:  Vitals:    20 0809 20 1231 20 1622 20 2226   BP: (!) 145/86 131/77 130/84 124/75   BP Location:       Pulse:  83 81 82   Resp:     Temp:   97.8  F (36.6  C) 98.2  F (36.8  C)   TempSrc:   Oral Oral   SpO2:       Weight:       Height:           Constitutional: healthy, alert and no distress    Abdomen:  Uterine fundus is firm, non-tender and at the level of the umbilicus     Ext: trace edema, no CT      LABS:  Hemoglobin   Date Value Ref Range Status   01/15/2020 12.5 11.7 - 15.7 g/dL Final   2017 11.2 (L) 11.7 - 15.7 g/dL Final       ASSESSMENT:  Post-partum day #2 s/p   Pregnancy complicated by gestational hypertension    Doing well.  No excessive bleeding  Pain well-controlled.       PLAN:   Ambulation encouraged  Discharge today.  Return to clinic in 6 weeks.   Continue routine postpartum cares  Will reduce synthroid dose to pre-pregnancy dosing.  She will self monitor BPs at home and return sooner than 6 weeks if needed.    Jumana Bautista MD

## 2020-03-10 ENCOUNTER — HEALTH MAINTENANCE LETTER (OUTPATIENT)
Age: 35
End: 2020-03-10

## 2020-12-20 ENCOUNTER — HEALTH MAINTENANCE LETTER (OUTPATIENT)
Age: 35
End: 2020-12-20

## 2021-04-24 ENCOUNTER — HEALTH MAINTENANCE LETTER (OUTPATIENT)
Age: 36
End: 2021-04-24

## 2021-10-03 ENCOUNTER — HEALTH MAINTENANCE LETTER (OUTPATIENT)
Age: 36
End: 2021-10-03

## 2022-05-15 ENCOUNTER — HEALTH MAINTENANCE LETTER (OUTPATIENT)
Age: 37
End: 2022-05-15

## 2022-08-26 ENCOUNTER — LAB REQUISITION (OUTPATIENT)
Dept: LAB | Facility: CLINIC | Age: 37
End: 2022-08-26

## 2022-08-26 DIAGNOSIS — Z01.419 ENCOUNTER FOR GYNECOLOGICAL EXAMINATION (GENERAL) (ROUTINE) WITHOUT ABNORMAL FINDINGS: ICD-10-CM

## 2022-08-26 LAB
ERYTHROCYTE [DISTWIDTH] IN BLOOD BY AUTOMATED COUNT: 13.9 % (ref 10–15)
FERRITIN SERPL-MCNC: 18 NG/ML (ref 6–175)
HCT VFR BLD AUTO: 39.4 % (ref 35–47)
HGB BLD-MCNC: 12.1 G/DL (ref 11.7–15.7)
MCH RBC QN AUTO: 28 PG (ref 26.5–33)
MCHC RBC AUTO-ENTMCNC: 30.7 G/DL (ref 31.5–36.5)
MCV RBC AUTO: 91 FL (ref 78–100)
PLATELET # BLD AUTO: 332 10E3/UL (ref 150–450)
RBC # BLD AUTO: 4.32 10E6/UL (ref 3.8–5.2)
WBC # BLD AUTO: 7.6 10E3/UL (ref 4–11)

## 2022-08-26 PROCEDURE — 85027 COMPLETE CBC AUTOMATED: CPT | Performed by: OBSTETRICS & GYNECOLOGY

## 2022-08-26 PROCEDURE — 82728 ASSAY OF FERRITIN: CPT | Performed by: OBSTETRICS & GYNECOLOGY

## 2022-09-11 ENCOUNTER — HEALTH MAINTENANCE LETTER (OUTPATIENT)
Age: 37
End: 2022-09-11

## 2023-03-15 NOTE — PROGRESS NOTES
Gynecologic Oncology Clinic - Follow-up Visit    Date: 2017    Dr. Jose Mendosa MD  [address] [address]     RE: Haylie Bales  : 1985  VARUN: 2017    HPI:    Haylie Bales is a 31 year old woman  at 23w2d s/p diagnostic laparoscopy with left salpingo-oophorectomy on 12/15/16. 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 Haylie states she is feeling well. She only used pain medication for the first 36 hours following surgery. She denies any nausea, vomiting, fever, chills, chest pain, shortness of breath, or dysuria. Reviewed further surgical restrictions including 6 weeks of pelvic rest and no heavy lifting. She follows with OBMARSHALL Bajwa with Dr. Macias. Patient feels good fetal movement      Review of Systems:  See HPI    Past Medical History:  Past Medical History   Diagnosis Date     No active medical problems      Pregnancy      Achilles tendonitis        Past Surgical History:  Past Surgical History   Procedure Laterality Date     Hc tooth extraction w/forcep       Lasik bilateral       Laparoscopic salpingo-oophorectomy Left 12/15/2016     Procedure: LAPAROSCOPIC SALPINGO-OOPHORECTOMY;  Surgeon: Jose Mendosa MD;  Location:  OR       Health Maintenance:  Health Maintenance Due   Topic Date Due     INFLUENZA VACCINE (SYSTEM ASSIGNED)  2016     MATERNAL SCREENING  2016     OBGCT (OB)  2017     PAP SCREENING Q3 YR (SYSTEM ASSIGNED)  2017       Current Medications:   Current Outpatient Prescriptions   Medication Sig Dispense Refill     Acmczw-Dsfhebqbo-Fnrs-Fish Oil (PRENATAL + COMPLETE MULTI PO) Take 1 tablet by mouth daily       UNABLE TO FIND Take 1 capsule by mouth daily MEDICATION NAME: [place] [employer] DHEA 830 mg - Omega 3 400 IU         Allergies:   Allergies   Allergen Reactions     Sulfa Drugs Rash        Social History:    Is This A New Presentation, Or A Follow-Up?: Follow Up Skin Lesion "  Social History   Substance Use Topics     Smoking status: Never Smoker      Smokeless tobacco: Never Used     Alcohol Use: No      Comment: For Marital Support stopped 2014       History   Drug Use No       Family History:   Family History   Problem Relation Age of Onset     Alcohol/Drug Mother      Etoh     Hypertension Mother      DIABETES Mother      Psychotic Disorder Father      Schizophrenia, Depression     Hypertension Father      CEREBROVASCULAR DISEASE Maternal Grandmother      CEREBROVASCULAR DISEASE Maternal Grandfather      Cardiovascular Paternal Grandmother      aortic dissection in her 80s       Physical Exam:   /73 mmHg  Pulse 90  Temp(Src) 97.9  F (36.6  C) (Oral)  Resp 16  Ht 1.676 m (5' 5.98\")  Wt 81.693 kg (180 lb 1.6 oz)  BMI 29.08 kg/m2  SpO2 97%  LMP 2016  Body mass index is 29.08 kg/(m^2).  General :  healthy and alert, no distress  Gastrointestinal:       abdomen soft, non-tender, non-distended, gravid, 4 laparoscopic sites visualized with good areas of healing, crusted skin present on incision site above umbilicus and on the right upper abdomen      Assessment:  Haylie Bales is a 31 year old woman  at 23w2d s/p diagnostic laparoscopy with left salpingo-oophorectomy on 12/15/16. 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 Patient is healing well from her surgery. Reviewed lifting precautions and pelvic rest for a total of 6 weeks from the surgery. Patient had no further questions or concerns. She will follow up with Dr. Macias as scheduled for routine OBGYN care.    Acting scribe for Dr. Navya Jiménez MD  OBGYN, PGY-3  2017 7:05 AM     Jose Mendosa MD, MS    Department of Obstetrics and Gynecology   Division of Gynecologic Oncology   [facility]" Additional History: Pt has MRSA [place]  Phone: [phone]    CC  Dr. Macias at [facility]    Answers for HPI/ROS submitted by the patient on 1/9/2017   General Symptoms: No  Skin Symptoms: No  HENT Symptoms: No  EYE SYMPTOMS: No  HEART SYMPTOMS: No  LUNG SYMPTOMS: No  INTESTINAL SYMPTOMS: No  URINARY SYMPTOMS: No  GYNECOLOGIC SYMPTOMS: No  BREAST SYMPTOMS: No  SKELETAL SYMPTOMS: No  BLOOD SYMPTOMS: No  NERVOUS SYSTEM SYMPTOMS: No  MENTAL HEALTH SYMPTOMS: No

## 2023-06-03 ENCOUNTER — HEALTH MAINTENANCE LETTER (OUTPATIENT)
Age: 38
End: 2023-06-03

## 2023-09-15 ENCOUNTER — LAB REQUISITION (OUTPATIENT)
Dept: LAB | Facility: CLINIC | Age: 38
End: 2023-09-15

## 2023-09-15 DIAGNOSIS — E03.9 HYPOTHYROIDISM, UNSPECIFIED: ICD-10-CM

## 2023-09-15 DIAGNOSIS — Z11.3 ENCOUNTER FOR SCREENING FOR INFECTIONS WITH A PREDOMINANTLY SEXUAL MODE OF TRANSMISSION: ICD-10-CM

## 2023-09-15 PROCEDURE — 86780 TREPONEMA PALLIDUM: CPT | Performed by: OBSTETRICS & GYNECOLOGY

## 2023-09-15 PROCEDURE — 84443 ASSAY THYROID STIM HORMONE: CPT | Performed by: OBSTETRICS & GYNECOLOGY

## 2023-09-15 PROCEDURE — 84439 ASSAY OF FREE THYROXINE: CPT | Performed by: OBSTETRICS & GYNECOLOGY

## 2023-09-15 PROCEDURE — 87389 HIV-1 AG W/HIV-1&-2 AB AG IA: CPT | Performed by: OBSTETRICS & GYNECOLOGY

## 2023-09-16 LAB
T PALLIDUM AB SER QL: NONREACTIVE
T4 FREE SERPL-MCNC: 1.24 NG/DL (ref 0.9–1.7)
TSH SERPL DL<=0.005 MIU/L-ACNC: 3.09 UIU/ML (ref 0.3–4.2)

## 2023-09-18 LAB — HIV 1+2 AB+HIV1 P24 AG SERPL QL IA: NONREACTIVE

## 2024-10-02 ENCOUNTER — LAB REQUISITION (OUTPATIENT)
Dept: LAB | Facility: CLINIC | Age: 39
End: 2024-10-02

## 2024-10-02 DIAGNOSIS — E03.9 HYPOTHYROIDISM, UNSPECIFIED: ICD-10-CM

## 2024-10-02 DIAGNOSIS — Z01.419 ENCOUNTER FOR GYNECOLOGICAL EXAMINATION (GENERAL) (ROUTINE) WITHOUT ABNORMAL FINDINGS: ICD-10-CM

## 2024-10-02 LAB
ERYTHROCYTE [DISTWIDTH] IN BLOOD BY AUTOMATED COUNT: 14.2 % (ref 10–15)
HCT VFR BLD AUTO: 38.2 % (ref 35–47)
HGB BLD-MCNC: 11.9 G/DL (ref 11.7–15.7)
MCH RBC QN AUTO: 28.1 PG (ref 26.5–33)
MCHC RBC AUTO-ENTMCNC: 31.2 G/DL (ref 31.5–36.5)
MCV RBC AUTO: 90 FL (ref 78–100)
PLATELET # BLD AUTO: 300 10E3/UL (ref 150–450)
RBC # BLD AUTO: 4.23 10E6/UL (ref 3.8–5.2)
WBC # BLD AUTO: 7.3 10E3/UL (ref 4–11)

## 2024-10-02 PROCEDURE — 85027 COMPLETE CBC AUTOMATED: CPT | Performed by: OBSTETRICS & GYNECOLOGY

## 2024-10-02 PROCEDURE — 84439 ASSAY OF FREE THYROXINE: CPT | Performed by: OBSTETRICS & GYNECOLOGY

## 2024-10-02 PROCEDURE — 84443 ASSAY THYROID STIM HORMONE: CPT | Performed by: OBSTETRICS & GYNECOLOGY

## 2024-10-03 LAB
T4 FREE SERPL-MCNC: 1.23 NG/DL (ref 0.9–1.7)
TSH SERPL DL<=0.005 MIU/L-ACNC: 2.98 UIU/ML (ref 0.3–4.2)

## 2024-11-08 ENCOUNTER — LAB REQUISITION (OUTPATIENT)
Dept: LAB | Facility: CLINIC | Age: 39
End: 2024-11-08

## 2024-11-08 DIAGNOSIS — R87.810 CERVICAL HIGH RISK HUMAN PAPILLOMAVIRUS (HPV) DNA TEST POSITIVE: ICD-10-CM

## 2024-11-08 PROCEDURE — 88305 TISSUE EXAM BY PATHOLOGIST: CPT | Performed by: PATHOLOGY

## 2024-11-12 LAB
PATH REPORT.COMMENTS IMP SPEC: NORMAL
PATH REPORT.COMMENTS IMP SPEC: NORMAL
PATH REPORT.FINAL DX SPEC: NORMAL
PATH REPORT.GROSS SPEC: NORMAL
PATH REPORT.MICROSCOPIC SPEC OTHER STN: NORMAL
PATH REPORT.RELEVANT HX SPEC: NORMAL
PHOTO IMAGE: NORMAL

## 2024-12-19 PROCEDURE — 88302 TISSUE EXAM BY PATHOLOGIST: CPT | Mod: TC,ORL | Performed by: OBSTETRICS & GYNECOLOGY

## 2024-12-19 PROCEDURE — 88302 TISSUE EXAM BY PATHOLOGIST: CPT | Mod: 26 | Performed by: PATHOLOGY

## 2024-12-20 ENCOUNTER — LAB REQUISITION (OUTPATIENT)
Dept: LAB | Facility: CLINIC | Age: 39
End: 2024-12-20
Payer: COMMERCIAL

## 2025-06-28 ENCOUNTER — HEALTH MAINTENANCE LETTER (OUTPATIENT)
Age: 40
End: 2025-06-28